# Patient Record
Sex: MALE | Race: BLACK OR AFRICAN AMERICAN | NOT HISPANIC OR LATINO | Employment: FULL TIME | URBAN - METROPOLITAN AREA
[De-identification: names, ages, dates, MRNs, and addresses within clinical notes are randomized per-mention and may not be internally consistent; named-entity substitution may affect disease eponyms.]

---

## 2017-01-11 ENCOUNTER — OFFICE VISIT (OUTPATIENT)
Dept: NEUROLOGY | Facility: CLINIC | Age: 32
End: 2017-01-11

## 2017-01-11 VITALS
HEART RATE: 83 BPM | WEIGHT: 158 LBS | SYSTOLIC BLOOD PRESSURE: 122 MMHG | HEIGHT: 74 IN | BODY MASS INDEX: 20.28 KG/M2 | OXYGEN SATURATION: 98 % | DIASTOLIC BLOOD PRESSURE: 74 MMHG

## 2017-01-11 DIAGNOSIS — G35 MULTIPLE SCLEROSIS (HCC): Primary | ICD-10-CM

## 2017-01-11 PROCEDURE — 99215 OFFICE O/P EST HI 40 MIN: CPT | Performed by: PSYCHIATRY & NEUROLOGY

## 2017-01-11 RX ORDER — LEVETIRACETAM 500 MG/1
TABLET, EXTENDED RELEASE ORAL
Qty: 60 TABLET | Refills: 6 | Status: SHIPPED | OUTPATIENT
Start: 2017-01-11 | End: 2017-02-28 | Stop reason: SDUPTHER

## 2017-01-11 NOTE — ASSESSMENT & PLAN NOTE
Sx at onset are severe.  Spastic gait with significant pain limiting ambulation to less than 25 feet with walker.     Agrees to proceed with Tysabri.  Ordered JCV.    Refer to PT    Start Keppra for LE spasticity

## 2017-01-11 NOTE — PROGRESS NOTES
Subjective:     Patient ID: Shane Hernandez is a 31 y.o. male.    History of Present Illness     30 yo male returns in follow up from hospitalization.  Oct 2016 developed numbness in left foot for 2 weeks then resolved.  12/19/16 LE started feeling weak and N/T started in anterior thighs.  Worsened to point where standing for over 5 minutes causes severe pain and cramping in LE from waist down.  Significant constipation but no bladder difficulty.  Reports perineal numbness.  Steroids and GBP of minimal benefit.   Works as  and required to stand all day.      Reviewed records from [facility] admission 12/26/16 - 12/30/16:    Presented to ED on 12/26/16 for numbness in bilateral thighs with periodic spasms of pain for 30 - 45 seconds.  Sx started 1 wk prior to admission.  Dr Art consulted and prescribed GBP, ordered LP.  Treated with IV Solu medrol     MRI; my review:    L/S - unremakable  C-spine - unremarkable  T-spine - T4; T7/8 lesions no enhancement  Brain - right MCP, pontine, CC, numerous PVWM lesions    Labs:  CSF:  IgG synthesis rate 23.7; 9 OCB, Prot 90   NMO - neg    The following portions of the patient's history were reviewed and updated as appropriate: allergies, current medications, past family history, past medical history, past social history, past surgical history and problem list.    Review of Systems   Constitutional: Positive for fatigue. Negative for activity change and unexpected weight change.   HENT: Negative for facial swelling, hearing loss, tinnitus, trouble swallowing and voice change.    Eyes: Negative for photophobia, pain and visual disturbance.   Respiratory: Negative for apnea, cough and choking.    Cardiovascular: Negative for chest pain.   Gastrointestinal: Negative for constipation, nausea and vomiting.   Endocrine: Positive for heat intolerance. Negative for cold intolerance.   Genitourinary: Negative for difficulty urinating, frequency and urgency.   Musculoskeletal:  Positive for back pain, gait problem and myalgias. Negative for arthralgias, neck pain and neck stiffness.   Skin: Negative for rash.   Allergic/Immunologic: Negative for immunocompromised state.   Neurological: Positive for tremors, weakness and numbness. Negative for dizziness, seizures, syncope, facial asymmetry, speech difficulty, light-headedness and headaches.   Hematological: Negative for adenopathy.   Psychiatric/Behavioral: Negative for confusion, decreased concentration, hallucinations and sleep disturbance. The patient is not nervous/anxious.         Objective:    Neurologic Exam     Mental Status   Oriented to person, place, and time.   Registration: recalls 3 of 3 objects. Recall at 5 minutes: recalls 3 of 3 objects. Follows 3 step commands.   Attention: normal. Concentration: normal.   Speech: speech is normal   Level of consciousness: alert  Knowledge: good and consistent with education.   Able to name object. Able to read. Able to repeat. Able to write. Normal comprehension.     Cranial Nerves     CN II   Visual fields full to confrontation.   Visual acuity: normal  Right visual field deficit: none  Left visual field deficit: none     CN III, IV, VI   Pupils are equal, round, and reactive to light.  Extraocular motions are normal.   Right pupil: Shape: regular. Reactivity: brisk. Consensual response: intact.   Left pupil: Shape: regular. Reactivity: brisk. Consensual response: intact.   Nystagmus: none   Diplopia: none  Ophthalmoparesis: none  Upgaze: normal  Downgaze: normal  Conjugate gaze: present  Vestibulo-ocular reflex: present    CN V   Facial sensation intact.   Right corneal reflex: normal  Left corneal reflex: normal    CN VII   Right facial weakness: none  Left facial weakness: none    CN VIII   Hearing: intact    CN IX, X   Palate: symmetric  Right gag reflex: normal  Left gag reflex: normal    CN XI   Right sternocleidomastoid strength: normal  Left sternocleidomastoid strength:  normal    CN XII   Tongue: not atrophic  Fasciculations: absent  Tongue deviation: none    Motor Exam   Muscle bulk: normal  Overall muscle tone: normal  Right arm tone: normal  Left arm tone: normal  Right leg tone: spastic  Left leg tone: spastic    Strength   Strength 5/5 throughout.     Sensory Exam   Light touch normal.   Right arm light touch: normal  Left arm light touch: normal  Right arm vibration: normal  Left arm vibration: normal  Right arm proprioception: normal  Left arm proprioception: normal  Right arm pinprick: normal  Left arm pinprick: normal  Lowest sensation to pinprick on right: T10  Lowest sensation to pinprick on left: T10  Lowest sensation to vibration on right: T10  Lowest sensation to vibration on left: T10    Gait, Coordination, and Reflexes     Gait  Gait: shuffling and spastic    Coordination   Romberg: negative  Finger to nose coordination: normal  Heel to shin coordination: abnormal  Tandem walking coordination: abnormal    Tremor   Resting tremor: absent  Intention tremor: absent  Action tremor: absent    Reflexes   Reflexes 2+ except as noted.   Right patellar: 3+  Left patellar: 3+  Right achilles: 3+  Left achilles: 3+  Right ankle clonus: present  Left ankle clonus: present      Physical Exam   Constitutional: He is oriented to person, place, and time. Vital signs are normal. He appears well-developed and well-nourished.   HENT:   Head: Normocephalic and atraumatic.   Eyes: EOM and lids are normal. Pupils are equal, round, and reactive to light.   Fundoscopic exam:       The right eye shows no exudate, no hemorrhage and no papilledema. The right eye shows venous pulsations.        The left eye shows no exudate, no hemorrhage and no papilledema. The left eye shows venous pulsations.   Neck: Normal range of motion and phonation normal. Normal carotid pulses present. Carotid bruit is not present. No thyroid mass and no thyromegaly present.   Cardiovascular: Normal rate, regular  rhythm and normal heart sounds.    Pulmonary/Chest: Effort normal.   Neurological: He is oriented to person, place, and time. He has normal strength. He has an abnormal Heel to Shin Test and an abnormal Tandem Gait Test. He has a normal Finger-Nose-Finger Test and a normal Romberg Test.   Reflex Scores:       Patellar reflexes are 3+ on the right side and 3+ on the left side.       Achilles reflexes are 3+ on the right side and 3+ on the left side.  Skin: Skin is warm and dry.   Psychiatric: He has a normal mood and affect. His speech is normal.   Nursing note and vitals reviewed.      Assessment/Plan:       Problems Addressed this Visit        Nervous and Auditory    Multiple sclerosis - Primary     Sx at onset are severe.  Spastic gait with significant pain limiting ambulation to less than 25 feet with walker.     Agrees to proceed with Tysabri.  Ordered JCV.    Refer to PT    Start Keppra for LE spasticity         Relevant Orders    Ambulatory Referral to Physical Therapy Evaluate and treat

## 2017-01-11 NOTE — MR AVS SNAPSHOT
Shane Hernandez   1/11/2017 9:15 AM   Office Visit    Dept Phone:  [phone]   Encounter #:  [id number]    Provider:  Segun Santos MD   Department:  [facility] NEUROLOGY                Your Full Care Plan              Today's Medication Changes          These changes are accurate as of: 1/11/17 10:15 AM.  If you have any questions, ask your nurse or doctor.               New Medication(s)Ordered:     levETIRAcetam  MG 24 hr tablet   Commonly known as:  KEPPRA XR   Take one tablet at bedtime for one week, then increase to two tablets at bedtime   Started by:  Segun Santos MD         Stop taking medication(s)listed here:     polyethylene glycol powder   Commonly known as:  MIRALAX   Stopped by:  Segun Santos MD                Where to Get Your Medications      These medications were sent to [facility] [address] [place] [address] [address] [facility] [phone] [facility] [phone] FX  [address] [facility] [place] [facility] [address]     Phone:  [phone]     levETIRAcetam  MG 24 hr tablet                  Your Updated Medication List          This list is accurate as of: 1/11/17 10:15 AM.  Always use your most recent med list.                gabapentin 400 MG capsule   Commonly known as:  NEURONTIN   Take 1 capsule by mouth Every 8 (Eight) Hours.       levETIRAcetam  MG 24 hr tablet   Commonly known as:  KEPPRA XR   Take one tablet at bedtime for one week, then increase to two tablets at bedtime               We Performed the Following     Ambulatory Referral to Physical Therapy Evaluate and treat       You Were Diagnosed With        Codes Comments    Multiple sclerosis    -  Primary ICD-10-CM: G35  ICD-9-CM: 340       Instructions     None    Patient Instructions History      Upcoming Appointments     Visit Type Date Time Department    HOSPITAL FOLLOW UP 1/11/2017  9:15 AM SUKHJINDER Mariano Signup     [facility] allows you to send  "messages to your doctor, view your test results, renew your prescriptions, schedule appointments, and more. To sign up, go to [email or web address] and click on the Sign Up Now link in the New User? box. Enter your [employer] Activation Code exactly as it appears below along with the last four digits of your Social Security Number and your Date of Birth () to complete the sign-up process. If you do not sign up before the expiration date, you must request a new code.    [employer] Activation Code: [email or web address]  Expires: 2017 10:15 AM    If you have questions, you can email [email or web address] or call [phone] to talk to our [employer] staff. Remember, [employer] is NOT to be used for urgent needs. For medical emergencies, dial 911.               Other Info from Your Visit           Allergies     No Known Allergies      Reason for Visit     N[facility]ness [facility] F/U    Extremity Weakness           Vital Signs     Blood Pressure Pulse Height Weight Oxygen Saturation Body Mass Index    122/74 83 74\" (188 cm) 158 lb (71.7 kg) 98% 20.29 kg/m2    Smoking Status                   Current Every Day Smoker           No Longer an Issue     Demyelinating disease of central nervous system    Multiple sclerosis    Weakness of both legs        "

## 2017-01-19 ENCOUNTER — HOSPITAL ENCOUNTER (OUTPATIENT)
Dept: PHYSICAL THERAPY | Facility: HOSPITAL | Age: 32
Setting detail: THERAPIES SERIES
Discharge: HOME | End: 2017-01-19
Attending: PSYCHIATRY & NEUROLOGY

## 2017-01-19 DIAGNOSIS — R52 PAIN: ICD-10-CM

## 2017-01-19 DIAGNOSIS — R26.89 BALANCE PROBLEM: ICD-10-CM

## 2017-01-19 DIAGNOSIS — R26.9 GAIT DIFFICULTY: Primary | ICD-10-CM

## 2017-01-19 PROCEDURE — 97163 PT EVAL HIGH COMPLEX 45 MIN: CPT | Performed by: PHYSICAL THERAPIST

## 2017-01-19 PROCEDURE — 97110 THERAPEUTIC EXERCISES: CPT | Performed by: PHYSICAL THERAPIST
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
## 2017-01-23 ENCOUNTER — HOSPITAL ENCOUNTER (OUTPATIENT)
Dept: PHYSICAL THERAPY | Facility: HOSPITAL | Age: 32
Setting detail: THERAPIES SERIES
Discharge: HOME | End: 2017-01-23
Attending: PSYCHIATRY & NEUROLOGY

## 2017-01-23 DIAGNOSIS — R26.89 BALANCE PROBLEM: ICD-10-CM

## 2017-01-23 DIAGNOSIS — R26.9 GAIT DIFFICULTY: Primary | ICD-10-CM

## 2017-01-23 PROCEDURE — 97110 THERAPEUTIC EXERCISES: CPT | Performed by: PHYSICAL THERAPIST

## 2017-01-23 PROCEDURE — 97112 NEUROMUSCULAR REEDUCATION: CPT | Performed by: PHYSICAL THERAPIST

## 2017-01-23 NOTE — PROGRESS NOTES
"    Outpatient Physical Therapy Neuro Treatment Note  [facility]     Patient Name: Shane Hernandez  : 1985  MRN: [id number]  Today's Date: 2017      Visit Date: 2017    Visit Dx:    ICD-10-CM ICD-9-CM   1. Gait difficulty R26.9 781.2   2. Balance problem R26.89 781.99       Patient Active Problem List   Diagnosis   • Multiple sclerosis                 PT Neuro       17 0800          Subjective Comments    Subjective Comments \"I'm good today.\"  -MW      Subjective Pain    Able to rate subjective pain? yes  -MW      Pre-Treatment Pain Level 2  -MW      Post-Treatment Pain Level 7  -MW      Balance Skills Training    Training Strategies (Balance Skills) Issued and performed HEP, see for details with quadraped, B sidestepping with blue tband, heel/toe raises, and bridging with VC's to perform properly.  Standing balance on blue foam with EC with SBA and then alternate tapping 10\" step x 10, one foot on step and hold with B cervical rot/flex/ext x 10 with min A and then B fwd step up onto/off 10\" step from foam x 10 with min A for balance.  Alternating lunge to BOSU without UE A x 10 with min A and LOB 20% of the time.  Standing on both sides of BOSU with B cerivcal rot/flex/ext x 10 with min/mod A for balance.  Half kneeling B with B cerivcal rot/flex/ext x 10 with CGA.  -MW        User Key  (r) = Recorded By, (t) = Taken By, (c) = Cosigned By    Initials Name Provider Type    RICK Wu, PT Physical Therapist                        PT Assessment/Plan       17 0800 17 1000       PT Assessment    Functional Limitations  Decreased safety during functional activities;Impaired gait;Impaired locomotion;Limitation in home management;Limitations in community activities;Performance in leisure activities;Performance in self-care ADL;Performance in work activities  -MW     Impairments  Balance;Coordination;Endurance;Gait;Impaired muscle endurance;Impaired sensory " "integrity;Locomotion;Motor function;Muscle strength;Pain;Sensation  -MW     Assessment Comments Pt. requires min/mod A with standing dynamic balance acitivites.  Pt. demonstrates increased B LE spasming with B LE activities.  Pt. and pts girlfriend educated on how to modify activity and rest and then exercise and not to push so much that he can't function.    -MW Pt. demonstrates decreased functional mobility secondary to MS.  Pt. to benefit from skilled PT services 2x/wk to improve towards goals.  -MW     Please refer to paper survey for additional self-reported information  Yes  -MW     Rehab Potential  Good  -MW     Patient/caregiver participated in establishment of treatment plan and goals  Yes  -MW     Patient would benefit from skilled therapy intervention  Yes  -MW     PT Plan    PT Frequency  2x/week  -MW     Predicted Duration of Therapy Intervention (days/wks)  24 visits  -MW     Planned CPT's?  PT EVAL: 90296;PT THER PROC EA 15 MIN: 83279;PT NEUROMUSC RE-EDUCATION EA 15 MIN: 17388;PT GAIT TRAINING EA 15 MIN: 57907  -MW     PT Plan Comments Continue with PT services to improve gait, balance, strength, transfers and functional mobility.  -MW PT services to improve gait, balance, strength, transfers and functional mobility.  -MW       User Key  (r) = Recorded By, (t) = Taken By, (c) = Cosigned By    Initials Name Provider Type    MW Amber Wu, PT Physical Therapist                     Exercises       01/23/17 0800          Subjective Comments    Subjective Comments \"I'm good today.\"  -MW      Subjective Pain    Able to rate subjective pain? yes  -MW      Pre-Treatment Pain Level 2  -MW      Post-Treatment Pain Level 7  -MW      Exercise 1    Exercise Name 1 NuStep L6  -MW      Time (Minutes) 1 12   B UE/LE's  -MW      Exercise 2    Exercise Name 2 DLP  -MW      Equipment 2 Leg Press  -MW      Sets 2 1  -MW      Time (Minutes) 2 2  -MW        User Key  (r) = Recorded By, (t) = Taken By, (c) = Cosigned " By    Initials Name Provider Type    RICK Wu, PT Physical Therapist                              PT OP Goals       01/19/17 1000          PT Short Term Goals    STG Date to Achieve 03/02/17  -MW      STG 1 Patient to improve PARSONS balance score to >/= 44/56 to decrease client's risk of falls.  -MW      STG 1 Progress New  -MW      STG 2 Patient to perform TUG within 14 sec without LOB for improved functional mobility.  -MW      STG 2 Progress New  -MW      STG 3 Patient to improve FGA score to >/= 13/30 to decrease client's risk of falls.  -MW      STG 3 Progress New  -MW      STG 4 Patient to score no greater then  40/60 on the MSWS-12 to demonstrate improved walking ability per patient's observations with daily activities.  -MW      STG 4 Progress New  -MW      STG 5 Pt. to score no greater then 80  on the MSIS-29 to demonstrate improved functional mobility.  -MW      STG 5 Progress New  -MW      Long Term Goals    LTG Date to Achieve 04/13/17  -MW      LTG 1 Patient to improve PARSONS balance score to >/= 51/56 to decrease client's risk of falls.  -MW      LTG 1 Progress New  -MW      LTG 2 Patient to perform TUG within 12 sec without LOB for improved functional mobility.  -MW      LTG 2 Progress New  -MW      LTG 3 Patient to improve FGA score to >/= 23/30 to decrease client's risk of falls.  -MW      LTG 3 Progress New  -MW      LTG 4 Pt. to improve DGI score to >/=  21/24 to decrease pts risk of falls  -MW      LTG 4 Progress New  -MW      LTG 5 Patient to ambulate 25 feet without AD within 8 sec without LOB at a regular pace for improved gait mela.  -MW      LTG 5 Progress New  -MW      LTG 6 Patient to ambulate 10 meters without AD within 10 sec without LOB for improved gait mela and functional mobility  -      LTG 6 Progress New  -MW      LTG 7 Patient to score no greater then  30/60 on the MSWS-12 to demonstrate improved walking ability per patient's observations with daily activities.  -MW       LTG 7 Progress New  -MW      LTG 8 Pt. to score no greater then 65 on the MSIS-29 to demonstrate improved functional mobility.  -MW      LTG 8 Progress New  -MW      LTG 9 Pt. to be I with HEP.  -MW      LTG 9 Progress New  -MW      Time Calculation    PT Goal Re-Cert Due Date 02/16/17  -MW        User Key  (r) = Recorded By, (t) = Taken By, (c) = Cosigned By    Initials Name Provider Type    RICK Wu PT Physical Therapist                Therapy Education       01/23/17 0904    Therapy Education    Given HEP;Symptoms/condition management  -MW    Program New  -MW    How Provided Verbal;Demonstration;Written  -MW    Provided to Caregiver;Patient  -MW    Level of Understanding Verbalized;Demonstrated  -MW      01/19/17 1337    Therapy Education    Given HEP;Symptoms/condition management  -MW    Program New  -MW    How Provided Verbal;Demonstration;Written  -MW    Provided to Patient;Caregiver  -MW    Level of Understanding Verbalized  -MW      User Key  (r) = Recorded By, (t) = Taken By, (c) = Cosigned By    Initials Name Provider Type    RICK Wu PT Physical Therapist                Time Calculation:   Start Time: 0800  Total Timed Code Minutes- PT: 55 minute(s)     Therapy Charges for Today     Code Description Service Date Service Provider Modifiers Qty    16423928194 HC PT NEUROMUSC RE EDUCATION EA 15 MIN 1/23/2017 Amber Wu, PT GP 3    18991536105 HC PT THER PROC EA 15 MIN 1/23/2017 Amber Wu PT GP 1                    Amber Wu, PT  1/23/2017

## 2017-01-25 ENCOUNTER — APPOINTMENT (OUTPATIENT)
Dept: PHYSICAL THERAPY | Facility: HOSPITAL | Age: 32
End: 2017-01-25
Attending: PSYCHIATRY & NEUROLOGY

## 2017-01-26 ENCOUNTER — TELEPHONE (OUTPATIENT)
Dept: NEUROLOGY | Facility: CLINIC | Age: 32
End: 2017-01-26

## 2017-01-26 NOTE — TELEPHONE ENCOUNTER
Peer to Peer completed by Dr. Santos with [employer] for authorization of Tysabri.  Approved from 1/27/17-1/26/18, #[id number].

## 2017-01-27 ENCOUNTER — HOSPITAL ENCOUNTER (OUTPATIENT)
Dept: PHYSICAL THERAPY | Facility: HOSPITAL | Age: 32
Setting detail: THERAPIES SERIES
Discharge: HOME | End: 2017-01-27
Attending: PSYCHIATRY & NEUROLOGY

## 2017-01-27 ENCOUNTER — TELEPHONE (OUTPATIENT)
Dept: NEUROLOGY | Facility: CLINIC | Age: 32
End: 2017-01-27

## 2017-01-27 DIAGNOSIS — R26.89 BALANCE PROBLEM: ICD-10-CM

## 2017-01-27 DIAGNOSIS — R26.9 GAIT DIFFICULTY: Primary | ICD-10-CM

## 2017-01-27 PROCEDURE — 97110 THERAPEUTIC EXERCISES: CPT | Performed by: PHYSICAL THERAPIST

## 2017-01-27 PROCEDURE — 97112 NEUROMUSCULAR REEDUCATION: CPT | Performed by: PHYSICAL THERAPIST

## 2017-01-27 RX ORDER — GABAPENTIN 400 MG/1
400 CAPSULE ORAL 3 TIMES DAILY
Qty: 90 CAPSULE | Refills: 5 | Status: SHIPPED | OUTPATIENT
Start: 2017-01-27 | End: 2017-05-05 | Stop reason: SDUPTHER

## 2017-01-27 NOTE — TELEPHONE ENCOUNTER
----- Message from Ashley Mcnair sent at 1/27/2017  1:56 PM EST -----  Regarding: REFILL  Contact: [phone]  Patient request refill Gabapentin 400mg    Nik GUSTAFSON    You didn't give to her but she stated that you said you will refill it

## 2017-02-03 ENCOUNTER — HOSPITAL ENCOUNTER (OUTPATIENT)
Dept: PHYSICAL THERAPY | Facility: HOSPITAL | Age: 32
Setting detail: THERAPIES SERIES
Discharge: HOME | End: 2017-02-03
Attending: PSYCHIATRY & NEUROLOGY

## 2017-02-03 DIAGNOSIS — R26.9 GAIT DIFFICULTY: Primary | ICD-10-CM

## 2017-02-03 DIAGNOSIS — R26.89 BALANCE PROBLEM: ICD-10-CM

## 2017-02-03 PROCEDURE — 97112 NEUROMUSCULAR REEDUCATION: CPT | Performed by: PHYSICAL THERAPIST

## 2017-02-03 PROCEDURE — 97110 THERAPEUTIC EXERCISES: CPT | Performed by: PHYSICAL THERAPIST

## 2017-02-03 NOTE — PROGRESS NOTES
"    Outpatient Physical Therapy Neuro Treatment Note  [facility]     Patient Name: Shane Hernandez  : 1985  MRN: [id number]  Today's Date: 2/3/2017      Visit Date: 2017    Visit Dx:    ICD-10-CM ICD-9-CM   1. Gait difficulty R26.9 781.2   2. Balance problem R26.89 781.99       Patient Active Problem List   Diagnosis   • Multiple sclerosis                 PT Neuro       17 08          Subjective Comments    Subjective Comments \"The infusion went better then I thought it would.\"  -MW      Subjective Pain    Able to rate subjective pain? yes  -MW      Pre-Treatment Pain Level 0  -MW      Balance Skills Training    Training Strategies (Balance Skills) Standing balance tandem on rounded surface of half foam roll with LE behind tapping cone with min A for balance, standing tandem on flat surface of half foam roll with B shoulder flexion overhead x 10 with CGA, standing sideways on half foam on both sides with sidestepping with min A and LOB 50% of the time.  Plyometric jumping on trampoline with/without UE A x 10, B hip ab/dduction jumping x 10, ski jump x 10 with CGA/min A.    -        User Key  (r) = Recorded By, (t) = Taken By, (c) = Cosigned By    Initials Name Provider Type    RICK Wu, PT Physical Therapist                        PT Assessment/Plan       17 08          PT Assessment    Assessment Comments Pt. requires min A to maintain balance with dynamic activities. Pt. demonstrates B muscle spasms with standing activities.  Pt. to benefit from continued therapy services.  -      PT Plan    PT Plan Comments Continue with PT services to improve gait, balance, strength and functional mobility.  -        User Key  (r) = Recorded By, (t) = Taken By, (c) = Cosigned By    Initials Name Provider Type    RICK Wu, PT Physical Therapist                     Exercises       17 08          Subjective Comments    Subjective Comments \"The infusion went better then " "I thought it would.\"  -MW      Subjective Pain    Able to rate subjective pain? yes  -MW      Pre-Treatment Pain Level 0  -MW      Exercise 1    Exercise Name 1 SciFit L4  -MW      Time (Minutes) 1 8   4 min fwd/4 min bwd  -MW      Exercise 2    Exercise Name 2 DLP  -MW      Equipment 2 Leg Press  -MW      Sets 2 1  -MW      Time (Minutes) 2 2  -MW      Exercise 3    Exercise Name 3 B fire hydrant  -MW      Sets 3 2  -MW      Reps 3 10  -MW      Exercise 4    Exercise Name 4 prone on elbows plank  -MW      Sets 4 1  -MW      Reps 4 10  -MW      Exercise 5    Exercise Name 5 B UE's on BOSU with push-up on knees and tricep press ups  -MW      Sets 5 2  -MW      Reps 5 5  -MW      Exercise 6    Exercise Name 6 supine B hamstring stretch  -MW      Sets 6 2  -MW      Time (Minutes) 6 1  -MW        User Key  (r) = Recorded By, (t) = Taken By, (c) = Cosigned By    Initials Name Provider Type    RICK Wu PT Physical Therapist                                  Therapy Education       02/03/17 1005    Therapy Education    Given Symptoms/condition management;Posture/body mechanics;Mobility training  -MW    Program Reinforced  -MW    How Provided Verbal  -MW    Provided to Patient  -MW    Level of Understanding Verbalized  -MW      User Key  (r) = Recorded By, (t) = Taken By, (c) = Cosigned By    Initials Name Provider Type    RICK Wu PT Physical Therapist                Time Calculation:   Start Time: 0800  Total Timed Code Minutes- PT: 55 minute(s)     Therapy Charges for Today     Code Description Service Date Service Provider Modifiers Qty    84747666897  PT NEUROMUSC RE EDUCATION EA 15 MIN 2/3/2017 Amber Wu, PT GP 2    [id number]  PT THER PROC EA 15 MIN 2/3/2017 Amber Wu PT GP 2                    Amber Wu, PT  2/3/2017     "

## 2017-02-13 ENCOUNTER — HOSPITAL ENCOUNTER (OUTPATIENT)
Dept: PHYSICAL THERAPY | Facility: HOSPITAL | Age: 32
Setting detail: THERAPIES SERIES
Discharge: HOME | End: 2017-02-13
Attending: PSYCHIATRY & NEUROLOGY

## 2017-02-13 DIAGNOSIS — R26.89 BALANCE PROBLEM: ICD-10-CM

## 2017-02-13 DIAGNOSIS — R26.9 GAIT DIFFICULTY: Primary | ICD-10-CM

## 2017-02-13 PROCEDURE — 97110 THERAPEUTIC EXERCISES: CPT | Performed by: PHYSICAL THERAPIST

## 2017-02-13 PROCEDURE — 97112 NEUROMUSCULAR REEDUCATION: CPT | Performed by: PHYSICAL THERAPIST

## 2017-02-13 NOTE — PROGRESS NOTES
"    Outpatient Physical Therapy Neuro Treatment Note  [facility]     Patient Name: Shane Hernandez  : 1985  MRN: [id number]  Today's Date: 2017      Visit Date: 2017    Visit Dx:    ICD-10-CM ICD-9-CM   1. Gait difficulty R26.9 781.2   2. Balance problem R26.89 781.99       Patient Active Problem List   Diagnosis   • Multiple sclerosis                 PT Neuro       17 0800          Subjective Comments    Subjective Comments \"I'm sorry I wasn't here last week.  I couldn't move my legs.  They were really stiff and I couldn't move. I stayed in bed most of the day.\"  -MW      Subjective Pain    Able to rate subjective pain? yes  -MW      Pre-Treatment Pain Level 4  -MW      Post-Treatment Pain Level 4  -MW      Balance Skills Training    Training Strategies (Balance Skills) Standing balance with alternating tapping 10\" step x 10, alternating fwd step up onto/off 10\" step and straddle step up B x 10 without UE A with CGA.  Standing on blue foam with alternating stepping up onto/off 10\" step x 10 with min A for balance.  Standing on trampoline with narrow SARAH, B semi-tandem and hold with EC up to 15 sec and then added B cervical rot/flex/ext x 10 with EC with min A.  Plyometric jumping on trampoline without UE A x 10, B hip ab/adduction jumping x 10, ski jumping x 10 with CGA without LOB.  Standing on rocker board R/L and ant/post with UE reaching across midline x 10, 2 sets.    -        User Key  (r) = Recorded By, (t) = Taken By, (c) = Cosigned By    Initials Name Provider Type     Amber Wu, PT Physical Therapist                        PT Assessment/Plan       17 0800          PT Assessment    Assessment Comments Pt. requires min A with standing dynamic balance activities.  Pt. reports incrase in B LE pain and discomfort with some standing dynmaic balance activities requiring pt to sit and rest.  Pt. to benefit from continued therapy services.  -      PT Plan    PT Plan " "Comments Continue with PT services to improve gait, balance, strength and functional mobility.    -MW        User Key  (r) = Recorded By, (t) = Taken By, (c) = Cosigned By    Initials Name Provider Type    RICK Wu PT Physical Therapist                     Exercises       02/13/17 0800          Subjective Comments    Subjective Comments \"I'm sorry I wasn't here last week.  I couldn't move my legs.  They were really stiff and I couldn't move. I stayed in bed most of the day.\"  -MW      Subjective Pain    Able to rate subjective pain? yes  -MW      Pre-Treatment Pain Level 4  -MW      Post-Treatment Pain Level 4  -MW      Exercise 1    Exercise Name 1 NuSTep L6  -MW      Time (Minutes) 1 12   B UE/LE\"s  -MW      Exercise 2    Exercise Name 2 Supine abdominal crunches keeping neck long  -MW      Sets 2 2  -MW      Reps 2 10  -MW      Exercise 3    Exercise Name 3 Lower abdominal crunch with LE's lifted  -MW      Sets 3 2  -MW      Reps 3 10  -MW        User Key  (r) = Recorded By, (t) = Taken By, (c) = Cosigned By    Initials Name Provider Type    RICK Wu PT Physical Therapist                                  Therapy Education       02/13/17 0908    Therapy Education    Given Symptoms/condition management;Posture/body mechanics;Mobility training   issued pt flyer about [employer] support group  -MW    Program Reinforced  -MW    How Provided Verbal  -MW    Provided to Patient  -MW    Level of Understanding Verbalized  -MW      User Key  (r) = Recorded By, (t) = Taken By, (c) = Cosigned By    Initials Name Provider Type    RICK Wu PT Physical Therapist                Time Calculation:   Start Time: 0800  Total Timed Code Minutes- PT: 55 minute(s)     Therapy Charges for Today     Code Description Service Date Service Provider Modifiers Qty    04174984153 HC PT NEUROMUSC RE EDUCATION EA 15 MIN 2/13/2017 Amber Wu, PT GP 2    09778522872 HC PT THER PROC EA 15 MIN 2/13/2017 " Amber Wu, PT GP 2                    Amber Wu, PT  2/13/2017

## 2017-02-16 ENCOUNTER — APPOINTMENT (OUTPATIENT)
Dept: PHYSICAL THERAPY | Facility: HOSPITAL | Age: 32
End: 2017-02-16

## 2017-02-20 ENCOUNTER — HOSPITAL ENCOUNTER (OUTPATIENT)
Dept: PHYSICAL THERAPY | Facility: HOSPITAL | Age: 32
Setting detail: THERAPIES SERIES
Discharge: HOME | End: 2017-02-20

## 2017-02-20 DIAGNOSIS — R26.89 BALANCE PROBLEM: ICD-10-CM

## 2017-02-20 DIAGNOSIS — R26.9 GAIT DIFFICULTY: Primary | ICD-10-CM

## 2017-02-20 PROCEDURE — 97112 NEUROMUSCULAR REEDUCATION: CPT | Performed by: PHYSICAL THERAPIST

## 2017-02-20 PROCEDURE — 97110 THERAPEUTIC EXERCISES: CPT | Performed by: PHYSICAL THERAPIST

## 2017-02-20 NOTE — THERAPY DISCHARGE NOTE
"      Outpatient Physical Therapy Neuro Treatment Note/Discharge Summary  [facility]     Patient Name: Shane Hernandez  : 1985  MRN: [id number]  Today's Date: 2017      Visit Date: 2017    Visit Dx:    ICD-10-CM ICD-9-CM   1. Gait difficulty R26.9 781.2   2. Balance problem R26.89 781.99       Patient Active Problem List   Diagnosis   • Multiple sclerosis                  PT Neuro       17 0800          Subjective Comments    Subjective Comments \"I'm doing good.\"  -MW      Subjective Pain    Able to rate subjective pain? yes  -MW      Pre-Treatment Pain Level 0  -MW      Post-Treatment Pain Level 0  -MW      Balance Skills Training    Training Strategies (Balance Skills) Re-assessment completed, see for details.  Pt. issued additional HEP for core, B LE strengthening with pt issued gray and green tband with min VC's.  -MW        User Key  (r) = Recorded By, (t) = Taken By, (c) = Cosigned By    Initials Name Provider Type    RICK Wu, PT Physical Therapist                        PT Assessment/Plan       17 0900          PT Assessment    Assessment Comments Pt. met all goals today and was issued HEP.  Pt. and his girlfriend educated that when he returns to work it cannot be full time and pt will require a seat to accomidate and pt agreed.  Pt. to be discharged from PT services secondary to meeting all goals.  -MW      Please refer to paper survey for additional self-reported information Yes  -MW      PT Plan    PT Plan Comments Discharge from PT services.  -MW        User Key  (r) = Recorded By, (t) = Taken By, (c) = Cosigned By    Initials Name Provider Type    RICK Wu, PT Physical Therapist                     Exercises       17 0800          Subjective Comments    Subjective Comments \"I'm doing good.\"  -MW      Subjective Pain    Able to rate subjective pain? yes  -MW      Pre-Treatment Pain Level 0  -MW      Post-Treatment Pain Level 0  -MW      Exercise 1 " "   Exercise Name 1 NuSTep L6  -MW      Time (Minutes) 1 14   B UE/LE\"s  -MW      Exercise 2    Exercise Name 2 Issued and performed HEP, see for details.  -MW        User Key  (r) = Recorded By, (t) = Taken By, (c) = Cosigned By    Initials Name Provider Type    RICK Wu, PT Physical Therapist                              PT OP Goals       02/20/17 0800          PT Short Term Goals    STG Date to Achieve 03/02/17  -MW      STG 1 Patient to improve PARSONS balance score to >/= 44/56 to decrease client's risk of falls.  -MW      STG 1 Progress Met  -MW      STG 2 Patient to perform TUG within 14 sec without LOB for improved functional mobility.  -MW      STG 2 Progress Met  -MW      STG 3 Patient to improve FGA score to >/= 13/30 to decrease client's risk of falls.  -MW      STG 3 Progress Met  -MW      STG 4 Patient to score no greater then  40/60 on the MSWS-12 to demonstrate improved walking ability per patient's observations with daily activities.  -MW      STG 4 Progress Met  -MW      STG 5 Pt. to score no greater then 80  on the MSIS-29 to demonstrate improved functional mobility.  -MW      STG 5 Progress Met  -MW      Long Term Goals    LTG Date to Achieve 04/13/17  -MW      LTG 1 Patient to improve PARSONS balance score to >/= 51/56 to decrease client's risk of falls.  -MW      LTG 1 Progress Met  -MW      LTG 2 Patient to perform TUG within 12 sec without LOB for improved functional mobility.  -MW      LTG 2 Progress Met  -MW      LTG 3 Patient to improve FGA score to >/= 23/30 to decrease client's risk of falls.  -MW      LTG 3 Progress Met  -MW      LTG 4 Pt. to improve DGI score to >/=  21/24 to decrease pts risk of falls  -MW      LTG 4 Progress Met  -MW      LTG 5 Patient to ambulate 25 feet without AD within 8 sec without LOB at a regular pace for improved gait emla.  -MW      LTG 5 Progress Met  -MW      LTG 6 Patient to ambulate 10 meters without AD within 10 sec without LOB for improved gait " mela and functional mobility  -MW      LTG 6 Progress Met  -MW      LTG 7 Patient to score no greater then  30/60 on the MSWS-12 to demonstrate improved walking ability per patient's observations with daily activities.  -MW      LTG 7 Progress Met  -MW      LTG 8 Pt. to score no greater then 65 on the MSIS-29 to demonstrate improved functional mobility.  -MW      LTG 8 Progress Met  -MW      LTG 9 Pt. to be I with HEP.  -MW      LTG 9 Progress Met  -MW        User Key  (r) = Recorded By, (t) = Taken By, (c) = Cosigned By    Initials Name Provider Type    RICK Wu PT Physical Therapist                Therapy Education       02/20/17 0920    Therapy Education    Given HEP;Symptoms/condition management;Other (comment)   returning to work  -MW    Program New  -MW    How Provided Verbal;Demonstration;Written  -MW    Provided to Patient;Caregiver  -MW    Level of Understanding Verbalized;Demonstrated;Teach back education performed  -MW      User Key  (r) = Recorded By, (t) = Taken By, (c) = Cosigned By    Initials Name Provider Type    RICK Wu PT Physical Therapist                Outcome Measures       02/20/17 0800          10 Meter Walk Test Self-Selected Velocity    Self-Selected Velocity: Trial 1 6.95 sec.  -MW      10 Meter Walk Test Fast Velocity    10 Meter Walk Fast Velocity: Trial 1 5.97 sec.  -MW      25 Foot Walk    Walk #1 5.92 seconds  -MW      Walk #2 4.42 seconds  -MW      25 Foot Walk Average Time 5.17 seconds  -MW      Vernon Balance Scale    Sitting to Standing 4  -MW      Standing Unsupported 4  -MW      Sitting with Back Unsupported but Feet Supported on Floor or on Stool 4  -MW      Standing to Sitting 4  -MW      Transfers 4  -MW      Standing Unsupported with Eyes Closed 4  -MW      Standing Unsupported with Feet Together 4  -MW      Reaching Forward with Outstretched Arm While Standing 4  -MW       Object From the Floor From a Standing Position 4  -MW      Turning  to Look Behind Over Left and Right Shoulders While Standing 4  -MW      Turn 360 Degrees 4  -MW      Place Alternate Foot on Step or Stool While Standing Unsupported 4  -MW      Standing Unsupported with One Foot in Front 4  -MW      Standing on One Leg 4  -MW      Vernon Total Score 56  -MW      Dynamic Gait Index (DGI)    Gait Level Surface 3  -MW      Change in Gait Speed 3  -MW      Gait with Horizontal Head Turns 3  -MW      Gait with Vertical Head Turns 3  -MW      Gait and Pivot Turn 3  -MW      Step Over Obstacle 3  -MW      Step Around Obstacles 3  -MW      Steps 2  -MW      Dynamic Gait Index Score 23  -MW      Functional Gait Assessment (FGA)    Gait Level Surface 3  -MW      Change in Gait Speed 3  -MW      Gait with Horizontal Head Turns 3  -MW      Gait with Vertical Head Turns 3  -MW      Gait and Pivot Turn 3  -MW      Step Over Obstacle 3  -MW      Gait with Narrow Base of Support 3  -MW      Gait with Eyes Closed 3  -MW      Ambulating Backwards 3  -MW      Steps 2  -MW      FGA Total Score 29  -MW      Mini Best    Mini Best Score/Comments 27/28  -MW      MSIS-12    MSIS-12 Comments 28/60  -MW      MSIS-29    MSIS-29 Comments 51  -MW      Timed Up and Go (TUG)    TUG Test 1 6.24 seconds  -MW      Functional Assessment    Outcome Measure Options 10 Meter Walk;25 Foot Walk;Vernon Balance;Dynamic Gait Index;FGA (Functional Gait Assessment);Mini-Best Test;MSIS-12;MSIS-29;Timed Up and Go (TUG)  -MW        User Key  (r) = Recorded By, (t) = Taken By, (c) = Cosigned By    Initials Name Provider Type     Amber Wu PT Physical Therapist          Time Calculation:   Start Time: 0800  Total Timed Code Minutes- PT: 60 minute(s)     Therapy Charges for Today     Code Description Service Date Service Provider Modifiers Qty    38317469977 HC PT NEUROMUSC RE EDUCATION EA 15 MIN 2/20/2017 Amber Wu, PT GP 3    81734461921 HC PT THER PROC EA 15 MIN 2/20/2017 Amber Wu, PT GP 1          PT  G-Codes  Outcome Measure Options: 10 Meter Walk, 25 Foot Walk, Vernon Balance, Dynamic Gait Index, FGA (Functional Gait Assessment), Mini-Best Test, MSIS-12, MSIS-29, Timed Up and Go (TUG)     OP PT Discharge Summary  Date of Discharge: 02/20/17  Reason for Discharge: All goals achieved  Outcomes Achieved: Able to achieve all goals within established timeline  Discharge Destination: Home with home program  Discharge Instructions: pt to return back to work with accomidations of 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

## 2017-02-23 ENCOUNTER — APPOINTMENT (OUTPATIENT)
Dept: PHYSICAL THERAPY | Facility: HOSPITAL | Age: 32
End: 2017-02-23

## 2017-02-27 ENCOUNTER — APPOINTMENT (OUTPATIENT)
Dept: PHYSICAL THERAPY | Facility: HOSPITAL | Age: 32
End: 2017-02-27

## 2017-02-28 ENCOUNTER — OFFICE VISIT (OUTPATIENT)
Dept: NEUROLOGY | Facility: CLINIC | Age: 32
End: 2017-02-28

## 2017-02-28 VITALS
HEART RATE: 77 BPM | BODY MASS INDEX: 20.35 KG/M2 | OXYGEN SATURATION: 98 % | WEIGHT: 158.6 LBS | DIASTOLIC BLOOD PRESSURE: 82 MMHG | SYSTOLIC BLOOD PRESSURE: 124 MMHG | HEIGHT: 74 IN

## 2017-02-28 DIAGNOSIS — G35 MULTIPLE SCLEROSIS (HCC): Primary | ICD-10-CM

## 2017-02-28 DIAGNOSIS — R25.2 SPASTICITY: ICD-10-CM

## 2017-02-28 PROCEDURE — 99213 OFFICE O/P EST LOW 20 MIN: CPT | Performed by: PSYCHIATRY & NEUROLOGY

## 2017-02-28 RX ORDER — LEVETIRACETAM 500 MG/1
1500 TABLET, EXTENDED RELEASE ORAL DAILY
Qty: 60 TABLET | Refills: 6 | Status: SHIPPED | OUTPATIENT
Start: 2017-02-28 | End: 2017-08-21 | Stop reason: ALTCHOICE

## 2017-02-28 NOTE — PROGRESS NOTES
Subjective:     Patient ID: Shane Hernandez is a 32 y.o. male.    History of Present Illness     32 yo male with RRMS and LE spasticity returns in follow up.  Last visit on 1/11/17 ordered JCV ab, referred to PT, started Keppra for spasticity and started Tysabri.    JCV ab index 2.44    RRMS     Two infusions of Tysabri with 90% decline in stiffness and N/T in LE.  Fatigue is mild to moderate.  Constipation improved.  Bladder working well.  Perineal numbness resolved.      LE spasticity    Stiffness and cramping in legs decreased with Keppra.  Improved sleep quality as legs would awaken from sleep.  Up walking on legs has burning and stinging in legs when up standing for over 10 to 15 minutes.        Problem history:    2016 developed numbness in left foot for 2 weeks then resolved.  12/19/16 LE started feeling weak and N/T started in anterior thighs.  Worsened to point where standing for over 5 minutes causes severe pain and cramping in LE from waist down.  Significant constipation but no bladder difficulty.  Reports perineal numbness.  Steroids and GBP of minimal benefit.   Works as  and required to stand all day.      Reviewed records from [facility] admission 12/26/16 - 12/30/16:    Presented to ED on 12/26/16 for numbness in bilateral thighs with periodic spasms of pain for 30 - 45 seconds.  Sx started 1 wk prior to admission.  Dr Art consulted and prescribed GBP, ordered LP.  Treated with IV Solu medrol     MRI; my review:    L/S - unremakable  C-spine - unremarkable  T-spine - T4; T7/8 lesions no enhancement  Brain - right MCP, pontine, CC, numerous PVWM lesions    Labs:  CSF:  IgG synthesis rate 23.7; 9 OCB, Prot 90   NMO - neg    The following portions of the patient's history were reviewed and updated as appropriate: allergies, current medications, past family history, past medical history, past social history, past surgical history and problem list.    Review of Systems   Constitutional: Negative  for activity change and unexpected weight change.   HENT: Negative for facial swelling, hearing loss, tinnitus, trouble swallowing and voice change.    Eyes: Negative for photophobia, pain and visual disturbance.   Respiratory: Negative for apnea and choking.    Gastrointestinal: Negative for constipation.   Endocrine: Positive for heat intolerance. Negative for cold intolerance.   Genitourinary: Negative for difficulty urinating, frequency and urgency.   Musculoskeletal: Positive for back pain and gait problem. Negative for neck stiffness.   Allergic/Immunologic: Negative for immunocompromised state.   Neurological: Positive for tremors. Negative for dizziness, seizures, syncope, facial asymmetry, speech difficulty and light-headedness.   Hematological: Negative for adenopathy.   Psychiatric/Behavioral: Negative for confusion, decreased concentration, hallucinations and sleep disturbance. The patient is not nervous/anxious.         Objective:    Neurologic Exam     Mental Status   Oriented to person, place, and time.   Registration: recalls 3 of 3 objects. Recall at 5 minutes: recalls 3 of 3 objects. Follows 3 step commands.   Attention: normal. Concentration: normal.   Speech: speech is normal   Level of consciousness: alert  Knowledge: good and consistent with education.   Able to name object. Able to read. Able to repeat. Able to write. Normal comprehension.     Cranial Nerves     CN II   Visual fields full to confrontation.   Visual acuity: normal  Right visual field deficit: none  Left visual field deficit: none     CN III, IV, VI   Pupils are equal, round, and reactive to light.  Extraocular motions are normal.   Right pupil: Shape: regular. Reactivity: brisk. Consensual response: intact.   Left pupil: Shape: regular. Reactivity: brisk. Consensual response: intact.   Nystagmus: none   Diplopia: none  Ophthalmoparesis: none  Upgaze: normal  Downgaze: normal  Conjugate gaze: present  Vestibulo-ocular reflex:  present    CN V   Facial sensation intact.   Right corneal reflex: normal  Left corneal reflex: normal    CN VII   Right facial weakness: none  Left facial weakness: none    CN VIII   Hearing: intact    CN IX, X   Palate: symmetric  Right gag reflex: normal  Left gag reflex: normal    CN XI   Right sternocleidomastoid strength: normal  Left sternocleidomastoid strength: normal    CN XII   Tongue: not atrophic  Fasciculations: absent  Tongue deviation: none    Motor Exam   Muscle bulk: normal  Overall muscle tone: normal  Right arm tone: normal  Left arm tone: normal  Right leg tone: spastic  Left leg tone: spastic    Strength   Strength 5/5 throughout.     Sensory Exam   Light touch normal.   Right arm light touch: normal  Left arm light touch: normal  Right arm vibration: normal  Left arm vibration: normal  Right arm proprioception: normal  Left arm proprioception: normal  Right arm pinprick: normal  Left arm pinprick: normal  Lowest sensation to pinprick on right: T10  Lowest sensation to pinprick on left: T10  Lowest sensation to vibration on right: T10  Lowest sensation to vibration on left: T10    Gait, Coordination, and Reflexes     Gait  Gait: shuffling and spastic    Coordination   Romberg: negative  Finger to nose coordination: normal  Heel to shin coordination: abnormal  Tandem walking coordination: abnormal    Tremor   Resting tremor: absent  Intention tremor: absent  Action tremor: absent    Reflexes   Reflexes 2+ except as noted.   Right patellar: 3+  Left patellar: 3+  Right achilles: 3+  Left achilles: 3+  Right ankle clonus: present  Left ankle clonus: present      Physical Exam   Constitutional: He is oriented to person, place, and time. Vital signs are normal. He appears well-developed and well-nourished.   HENT:   Head: Normocephalic and atraumatic.   Eyes: EOM and lids are normal. Pupils are equal, round, and reactive to light.   Fundoscopic exam:       The right eye shows no exudate, no  hemorrhage and no papilledema. The right eye shows venous pulsations.        The left eye shows no exudate, no hemorrhage and no papilledema. The left eye shows venous pulsations.   Neck: Normal range of motion and phonation normal. Normal carotid pulses present. Carotid bruit is not present. No thyroid mass and no thyromegaly present.   Cardiovascular: Normal rate, regular rhythm and normal heart sounds.    Pulmonary/Chest: Effort normal.   Neurological: He is oriented to person, place, and time. He has normal strength. He has an abnormal Heel to Shin Test and an abnormal Tandem Gait Test. He has a normal Finger-Nose-Finger Test and a normal Romberg Test.   Reflex Scores:       Patellar reflexes are 3+ on the right side and 3+ on the left side.       Achilles reflexes are 3+ on the right side and 3+ on the left side.  Skin: Skin is warm and dry.   Psychiatric: He has a normal mood and affect. His speech is normal.   Nursing note and vitals reviewed.      Assessment/Plan:       Problems Addressed this Visit        Nervous and Auditory    Multiple sclerosis - Primary     Significant improvement after two infusions    Pt JCV ab positive and in Touch REMS program    RTC March 24             Musculoskeletal and Integument    Spasticity     Improved with Keppra but still present  Increase Keppra XR 1500 mg qday

## 2017-02-28 NOTE — ASSESSMENT & PLAN NOTE
Significant improvement after two infusions    Pt JCV ab positive and in Touch REMS program    RTC March 24

## 2017-03-02 ENCOUNTER — APPOINTMENT (OUTPATIENT)
Dept: PHYSICAL THERAPY | Facility: HOSPITAL | Age: 32
End: 2017-03-02

## 2017-03-27 ENCOUNTER — OFFICE VISIT (OUTPATIENT)
Dept: NEUROLOGY | Facility: CLINIC | Age: 32
End: 2017-03-27

## 2017-03-27 VITALS
SYSTOLIC BLOOD PRESSURE: 118 MMHG | BODY MASS INDEX: 20.92 KG/M2 | HEIGHT: 74 IN | DIASTOLIC BLOOD PRESSURE: 80 MMHG | WEIGHT: 163 LBS

## 2017-03-27 DIAGNOSIS — R25.2 SPASTICITY: ICD-10-CM

## 2017-03-27 DIAGNOSIS — G35 MULTIPLE SCLEROSIS (HCC): Primary | ICD-10-CM

## 2017-03-27 PROCEDURE — 99213 OFFICE O/P EST LOW 20 MIN: CPT | Performed by: PSYCHIATRY & NEUROLOGY

## 2017-03-27 NOTE — PROGRESS NOTES
Subjective:     Patient ID: Shane Hernandez is a 32 y.o. male.    History of Present Illness     30 yo male with RRMS and LE spasticity returns in follow up.  Last visit on 2/28/17 increased Keppra for spasticity and continued Tysabri.    JCV ab index 2.44    RRMS     Three infusions of Tysabri with mild flu like sx afterwards.  Fatigue is mild.  Started working last night and able to make it through shift.  Sitting down at breaks does have some numbness in legs and increased stiffness.        LE spasticity    Stiffness and cramping in legs controlled with Keppra.      Problem history:    2016 developed numbness in left foot for 2 weeks then resolved.  12/19/16 LE started feeling weak and N/T started in anterior thighs.  Worsened to point where standing for over 5 minutes causes severe pain and cramping in LE from waist down.  Significant constipation but no bladder difficulty.  Reports perineal numbness.  Steroids and GBP of minimal benefit.   Works as  and required to stand all day.      Reviewed records from [facility] admission 12/26/16 - 12/30/16:    Presented to ED on 12/26/16 for numbness in bilateral thighs with periodic spasms of pain for 30 - 45 seconds.  Sx started 1 wk prior to admission.  Dr Art consulted and prescribed GBP, ordered LP.  Treated with IV Solu medrol     MRI;      L/S - unremakable  C-spine - unremarkable  T-spine - T4; T7/8 lesions no enhancement  Brain - right MCP, pontine, CC, numerous PVWM lesions    Labs:  CSF:  IgG synthesis rate 23.7; 9 OCB, Prot 90   NMO - neg    The following portions of the patient's history were reviewed and updated as appropriate: allergies, current medications, past family history, past medical history, past social history, past surgical history and problem list.    Review of Systems   Constitutional: Positive for fatigue. Negative for activity change and unexpected weight change.   HENT: Negative for facial swelling, hearing loss, tinnitus, trouble  swallowing and voice change.    Eyes: Negative for photophobia, pain and visual disturbance.   Respiratory: Negative for apnea and choking.    Gastrointestinal: Negative for constipation.   Endocrine: Positive for heat intolerance. Negative for cold intolerance.   Genitourinary: Negative for difficulty urinating, frequency and urgency.   Musculoskeletal: Positive for back pain and gait problem. Negative for neck stiffness.   Allergic/Immunologic: Negative for immunocompromised state.   Neurological: Positive for tremors. Negative for dizziness, seizures, syncope, facial asymmetry, speech difficulty and light-headedness.   Hematological: Negative for adenopathy.   Psychiatric/Behavioral: Negative for confusion, decreased concentration, hallucinations and sleep disturbance. The patient is not nervous/anxious.         Objective:    Neurologic Exam     Mental Status   Registration: recalls 3 of 3 objects. Recall at 5 minutes: recalls 3 of 3 objects. Follows 3 step commands.   Attention: normal. Concentration: normal.   Level of consciousness: alert  Knowledge: good and consistent with education.   Able to name object. Able to read. Able to repeat. Able to write. Normal comprehension.     Cranial Nerves     CN II   Visual fields full to confrontation.   Visual acuity: normal  Right visual field deficit: none  Left visual field deficit: none     CN III, IV, VI   Right pupil: Shape: regular. Reactivity: brisk. Consensual response: intact.   Left pupil: Shape: regular. Reactivity: brisk. Consensual response: intact.   Nystagmus: none   Diplopia: none  Ophthalmoparesis: none  Upgaze: normal  Downgaze: normal  Conjugate gaze: present  Vestibulo-ocular reflex: present    CN V   Facial sensation intact.   Right corneal reflex: normal  Left corneal reflex: normal    CN VII   Right facial weakness: none  Left facial weakness: none    CN VIII   Hearing: intact    CN IX, X   Palate: symmetric  Right gag reflex: normal  Left gag  reflex: normal    CN XI   Right sternocleidomastoid strength: normal  Left sternocleidomastoid strength: normal    CN XII   Tongue: not atrophic  Fasciculations: absent  Tongue deviation: none    Motor Exam   Muscle bulk: normal  Overall muscle tone: normal  Right arm tone: normal  Left arm tone: normal  Right leg tone: spastic  Left leg tone: spastic    Sensory Exam   Light touch normal.   Right arm light touch: normal  Left arm light touch: normal  Right arm vibration: normal  Left arm vibration: normal  Right arm proprioception: normal  Left arm proprioception: normal  Right arm pinprick: normal  Left arm pinprick: normal  Lowest sensation to pinprick on right: T10  Lowest sensation to pinprick on left: T10  Lowest sensation to vibration on right: T10  Lowest sensation to vibration on left: T10    Gait, Coordination, and Reflexes     Gait  Gait: normal    Tremor   Resting tremor: absent  Intention tremor: absent  Action tremor: absent    Reflexes   Reflexes 2+ except as noted.   Right patellar: 3+  Left patellar: 3+  Right achilles: 3+  Left achilles: 3+  Right ankle clonus: present  Left ankle clonus: present      Physical Exam   Constitutional: He appears well-developed and well-nourished.   Neurological: Gait normal.   Reflex Scores:       Patellar reflexes are 3+ on the right side and 3+ on the left side.       Achilles reflexes are 3+ on the right side and 3+ on the left side.  Nursing note and vitals reviewed.      Assessment/Plan:       Problems Addressed this Visit        Nervous and Auditory    Multiple sclerosis - Primary     JCV index 2.44  Three Tysabri infusions with improvement and returned to work    Plan to switch to Ocrevus in 3 months.                Musculoskeletal and Integument    Spasticity     Improved with Keppra XR 1500 mg qday

## 2017-05-05 ENCOUNTER — APPOINTMENT (OUTPATIENT)
Dept: LAB | Facility: HOSPITAL | Age: 32
End: 2017-05-05

## 2017-05-05 ENCOUNTER — OFFICE VISIT (OUTPATIENT)
Dept: NEUROLOGY | Facility: CLINIC | Age: 32
End: 2017-05-05

## 2017-05-05 VITALS
HEART RATE: 83 BPM | BODY MASS INDEX: 19.66 KG/M2 | OXYGEN SATURATION: 97 % | SYSTOLIC BLOOD PRESSURE: 128 MMHG | HEIGHT: 74 IN | WEIGHT: 153.2 LBS | DIASTOLIC BLOOD PRESSURE: 72 MMHG

## 2017-05-05 DIAGNOSIS — G35 MULTIPLE SCLEROSIS (HCC): Primary | ICD-10-CM

## 2017-05-05 DIAGNOSIS — R25.2 SPASTICITY: ICD-10-CM

## 2017-05-05 LAB
ALBUMIN SERPL-MCNC: 4.4 G/DL (ref 3.2–4.8)
ALBUMIN/GLOB SERPL: 1.7 G/DL (ref 1.5–2.5)
ALP SERPL-CCNC: 78 U/L (ref 25–100)
ALT SERPL W P-5'-P-CCNC: 11 U/L (ref 7–40)
ANION GAP SERPL CALCULATED.3IONS-SCNC: 1 MMOL/L (ref 3–11)
AST SERPL-CCNC: 20 U/L (ref 0–33)
BASOPHILS # BLD AUTO: 0.05 10*3/MM3 (ref 0–0.2)
BASOPHILS NFR BLD AUTO: 0.7 % (ref 0–1)
BILIRUB SERPL-MCNC: 0.7 MG/DL (ref 0.3–1.2)
BUN BLD-MCNC: 10 MG/DL (ref 9–23)
BUN/CREAT SERPL: 11.1 (ref 7–25)
CALCIUM SPEC-SCNC: 10 MG/DL (ref 8.7–10.4)
CHLORIDE SERPL-SCNC: 110 MMOL/L (ref 99–109)
CO2 SERPL-SCNC: 31 MMOL/L (ref 20–31)
CREAT BLD-MCNC: 0.9 MG/DL (ref 0.6–1.3)
DEPRECATED RDW RBC AUTO: 43.9 FL (ref 37–54)
EOSINOPHIL # BLD AUTO: 0.55 10*3/MM3 (ref 0.1–0.3)
EOSINOPHIL NFR BLD AUTO: 7.3 % (ref 0–3)
ERYTHROCYTE [DISTWIDTH] IN BLOOD BY AUTOMATED COUNT: 13.4 % (ref 11.3–14.5)
GFR SERPL CREATININE-BSD FRML MDRD: 117 ML/MIN/1.73
GLOBULIN UR ELPH-MCNC: 2.6 GM/DL
GLUCOSE BLD-MCNC: 87 MG/DL (ref 70–100)
HAV IGM SERPL QL IA: NORMAL
HBV CORE IGM SERPL QL IA: NORMAL
HBV SURFACE AG SERPL QL IA: NORMAL
HCT VFR BLD AUTO: 42 % (ref 38.9–50.9)
HCV AB SER DONR QL: NORMAL
HGB BLD-MCNC: 13.8 G/DL (ref 13.1–17.5)
IMM GRANULOCYTES # BLD: 0.02 10*3/MM3 (ref 0–0.03)
IMM GRANULOCYTES NFR BLD: 0.3 % (ref 0–0.6)
LYMPHOCYTES # BLD AUTO: 3.07 10*3/MM3 (ref 0.6–4.8)
LYMPHOCYTES NFR BLD AUTO: 40.6 % (ref 24–44)
MCH RBC QN AUTO: 29.6 PG (ref 27–31)
MCHC RBC AUTO-ENTMCNC: 32.9 G/DL (ref 32–36)
MCV RBC AUTO: 89.9 FL (ref 80–99)
MONOCYTES # BLD AUTO: 0.74 10*3/MM3 (ref 0–1)
MONOCYTES NFR BLD AUTO: 9.8 % (ref 0–12)
NEUTROPHILS # BLD AUTO: 3.13 10*3/MM3 (ref 1.5–8.3)
NEUTROPHILS NFR BLD AUTO: 41.3 % (ref 41–71)
PLATELET # BLD AUTO: 236 10*3/MM3 (ref 150–450)
PMV BLD AUTO: 10.1 FL (ref 6–12)
POTASSIUM BLD-SCNC: 3.8 MMOL/L (ref 3.5–5.5)
PROT SERPL-MCNC: 7 G/DL (ref 5.7–8.2)
RBC # BLD AUTO: 4.67 10*6/MM3 (ref 4.2–5.76)
SODIUM BLD-SCNC: 142 MMOL/L (ref 132–146)
WBC NRBC COR # BLD: 7.56 10*3/MM3 (ref 3.5–10.8)

## 2017-05-05 PROCEDURE — 86701 HIV-1ANTIBODY: CPT | Performed by: PSYCHIATRY & NEUROLOGY

## 2017-05-05 PROCEDURE — 86702 HIV-2 ANTIBODY: CPT | Performed by: PSYCHIATRY & NEUROLOGY

## 2017-05-05 PROCEDURE — 80074 ACUTE HEPATITIS PANEL: CPT | Performed by: PSYCHIATRY & NEUROLOGY

## 2017-05-05 PROCEDURE — 36415 COLL VENOUS BLD VENIPUNCTURE: CPT | Performed by: PSYCHIATRY & NEUROLOGY

## 2017-05-05 PROCEDURE — 80053 COMPREHEN METABOLIC PANEL: CPT | Performed by: PSYCHIATRY & NEUROLOGY

## 2017-05-05 PROCEDURE — 99214 OFFICE O/P EST MOD 30 MIN: CPT | Performed by: PSYCHIATRY & NEUROLOGY

## 2017-05-05 PROCEDURE — 86480 TB TEST CELL IMMUN MEASURE: CPT | Performed by: PSYCHIATRY & NEUROLOGY

## 2017-05-05 PROCEDURE — 85025 COMPLETE CBC W/AUTO DIFF WBC: CPT | Performed by: PSYCHIATRY & NEUROLOGY

## 2017-05-05 RX ORDER — GABAPENTIN 400 MG/1
800 CAPSULE ORAL 3 TIMES DAILY
Qty: 180 CAPSULE | Refills: 5 | Status: SHIPPED | OUTPATIENT
Start: 2017-05-05

## 2017-05-08 LAB
HIV 1 & 2 AB SERPLBLD IA.RAPID: NORMAL
HIV 2 AB SERPLBLD QL IA.RAPID: NEGATIVE
HIV1 AB SERPLBLD QL IA.RAPID: NEGATIVE
VZV IGG SER IA-ACNC: >4000 INDEX

## 2017-05-14 LAB
INTERPRETATION: NORMAL
M TB TUBERC IFN-G BLD QL: NEGATIVE
QFT TB AG MINUS NIL VALUE: 0.03 IU/ML
QUANTIFERON CRITERIA: NORMAL
QUANTIFERON MITOGEN VALUE: >10 IU/ML
QUANTIFERON NIL VALUE: 0.06 IU/ML
QUANTIFERON TB AG VALUE: 0.09 IU/ML

## 2017-05-19 ENCOUNTER — OFFICE VISIT (OUTPATIENT)
Dept: NEUROLOGY | Facility: CLINIC | Age: 32
End: 2017-05-19

## 2017-05-19 VITALS
HEART RATE: 82 BPM | OXYGEN SATURATION: 98 % | DIASTOLIC BLOOD PRESSURE: 76 MMHG | BODY MASS INDEX: 19.02 KG/M2 | SYSTOLIC BLOOD PRESSURE: 120 MMHG | HEIGHT: 74 IN | WEIGHT: 148.2 LBS

## 2017-05-19 DIAGNOSIS — R25.2 SPASTICITY: ICD-10-CM

## 2017-05-19 DIAGNOSIS — G35 MULTIPLE SCLEROSIS (HCC): Primary | ICD-10-CM

## 2017-05-19 PROCEDURE — 99214 OFFICE O/P EST MOD 30 MIN: CPT | Performed by: PSYCHIATRY & NEUROLOGY

## 2017-05-19 RX ORDER — OXCARBAZEPINE 300 MG/1
600 TABLET, FILM COATED ORAL 2 TIMES DAILY
Qty: 120 TABLET | Refills: 11 | Status: SHIPPED | OUTPATIENT
Start: 2017-05-19 | End: 2017-09-13

## 2017-06-19 RX ORDER — DIPHENHYDRAMINE HCL 25 MG
25 CAPSULE ORAL ONCE
Status: CANCELLED | OUTPATIENT
Start: 2017-06-19

## 2017-06-19 RX ORDER — SODIUM CHLORIDE 9 MG/ML
250 INJECTION, SOLUTION INTRAVENOUS ONCE
Status: CANCELLED | OUTPATIENT
Start: 2017-06-19

## 2017-06-19 RX ORDER — ACETAMINOPHEN 325 MG/1
650 TABLET ORAL ONCE
Status: CANCELLED | OUTPATIENT
Start: 2017-06-19

## 2017-07-10 ENCOUNTER — OFFICE VISIT (OUTPATIENT)
Dept: NEUROLOGY | Facility: CLINIC | Age: 32
End: 2017-07-10

## 2017-07-10 VITALS
HEART RATE: 98 BPM | DIASTOLIC BLOOD PRESSURE: 72 MMHG | HEIGHT: 74 IN | SYSTOLIC BLOOD PRESSURE: 118 MMHG | WEIGHT: 146.6 LBS | BODY MASS INDEX: 18.82 KG/M2

## 2017-07-10 DIAGNOSIS — G35 MULTIPLE SCLEROSIS (HCC): Primary | ICD-10-CM

## 2017-07-10 DIAGNOSIS — R25.2 SPASTICITY: ICD-10-CM

## 2017-07-10 PROCEDURE — 99214 OFFICE O/P EST MOD 30 MIN: CPT | Performed by: PSYCHIATRY & NEUROLOGY

## 2017-07-10 NOTE — PROGRESS NOTES
Subjective:     Patient ID: Shane Hernandez is a 32 y.o. male.    History of Present Illness     30 yo male with RRMS and LE spasticity returns in follow up.  Last visit on 5/19/17 continued Keppra for spasticity and Tysabri, recommending stopping work and discussed Lemtrada and ordered pre labs.    Pre labs unremarkable  HIV, Hepatitis, CBC, CMP, VZV ab, TB  JCV ab index 2.19  5/19/17    RRMS     Last week prior to Tysabri N/T returning with stiffness and heaviness in legs.  Fatigue is moderate.  Increased sleeping.  Heat intolerance not present.   N/T continues in legs up to hips.      Sx in hands of feeling hard/fiery/cold resolved.      Six infusions of Tysabri.      LE spasticity    Stiffness and cramping in legs returning last week of Tysabri.  Keppra decreasing sx..     Problem history:    2016 developed numbness in left foot for 2 weeks then resolved.  12/19/16 LE started feeling weak and N/T started in anterior thighs.  Worsened to point where standing for over 5 minutes causes severe pain and cramping in LE from waist down.  Significant constipation but no bladder difficulty.  Reports perineal numbness.  Steroids and GBP of minimal benefit.   Works as  and required to stand all day.      Reviewed records from [facility] admission 12/26/16 - 12/30/16:    Presented to ED on 12/26/16 for numbness in bilateral thighs with periodic spasms of pain for 30 - 45 seconds.  Sx started 1 wk prior to admission.  Dr Art consulted and prescribed GBP, ordered LP.  Treated with IV Solu medrol     MRI;      L/S - unremakable  C-spine - unremarkable  T-spine - T4; T7/8 lesions no enhancement  Brain - right MCP, pontine, CC, numerous PVWM lesions    Labs:  CSF:  IgG synthesis rate 23.7; 9 OCB, Prot 90   NMO - neg    The following portions of the patient's history were reviewed and updated as appropriate: allergies, current medications, past family history, past medical history, past social history, past surgical  "history and problem list.    Review of Systems   Constitutional: Negative for activity change and unexpected weight change.   HENT: Negative for facial swelling, hearing loss, tinnitus, trouble swallowing and voice change.    Eyes: Negative for photophobia, pain and visual disturbance.   Respiratory: Negative for apnea and choking.    Gastrointestinal: Negative for constipation.   Endocrine: Positive for heat intolerance. Negative for cold intolerance.   Genitourinary: Negative for difficulty urinating, frequency and urgency.   Musculoskeletal: Positive for back pain and gait problem. Negative for neck stiffness.   Allergic/Immunologic: Negative for immunocompromised state.   Neurological: Positive for tremors. Negative for dizziness, seizures, syncope, facial asymmetry, speech difficulty and light-headedness.   Hematological: Negative for adenopathy.   Psychiatric/Behavioral: Negative for confusion, decreased concentration, hallucinations and sleep disturbance. The patient is not nervous/anxious.         Objective:  Vitals:    07/10/17 0901   BP: 118/72   Pulse: 98   Weight: 146 lb 9.6 oz (66.5 kg)   Height: 74\" (188 cm)       Neurologic Exam     Mental Status   Registration: recalls 3 of 3 objects. Recall at 5 minutes: recalls 3 of 3 objects. Follows 3 step commands.   Attention: normal. Concentration: normal.   Level of consciousness: alert  Knowledge: good and consistent with education.   Able to name object. Able to read. Able to repeat. Able to write. Normal comprehension.     Cranial Nerves     CN II   Visual fields full to confrontation.   Visual acuity: normal  Right visual field deficit: none  Left visual field deficit: none     CN III, IV, VI   Right pupil: Shape: regular. Reactivity: brisk. Consensual response: intact.   Left pupil: Shape: regular. Reactivity: brisk. Consensual response: intact.   Nystagmus: none   Diplopia: none  Ophthalmoparesis: none  Upgaze: normal  Downgaze: normal  Conjugate gaze: " present  Vestibulo-ocular reflex: present    CN V   Facial sensation intact.   Right corneal reflex: normal  Left corneal reflex: normal    CN VII   Right facial weakness: none  Left facial weakness: none    CN VIII   Hearing: intact    CN IX, X   Palate: symmetric  Right gag reflex: normal  Left gag reflex: normal    CN XI   Right sternocleidomastoid strength: normal  Left sternocleidomastoid strength: normal    CN XII   Tongue: not atrophic  Fasciculations: absent  Tongue deviation: none    Motor Exam   Muscle bulk: normal  Overall muscle tone: normal  Right arm tone: normal  Left arm tone: normal  Right leg tone: spastic  Left leg tone: spastic    Sensory Exam   Light touch normal.   Right arm light touch: normal  Left arm light touch: normal  Right arm vibration: normal  Left arm vibration: normal  Right arm proprioception: normal  Left arm proprioception: normal  Right arm pinprick: normal  Left arm pinprick: normal  Lowest sensation to pinprick on right: T10  Lowest sensation to pinprick on left: T10  Lowest sensation to vibration on right: T10  Lowest sensation to vibration on left: T10    Gait, Coordination, and Reflexes     Gait  Gait: normal    Tremor   Resting tremor: absent  Intention tremor: absent  Action tremor: absent    Reflexes   Reflexes 2+ except as noted.   Right patellar: 3+  Left patellar: 3+  Right achilles: 3+  Left achilles: 3+  Right ankle clonus: present  Left ankle clonus: present      Physical Exam   Constitutional: He appears well-developed and well-nourished.   Neurological: Gait normal.   Reflex Scores:       Patellar reflexes are 3+ on the right side and 3+ on the left side.       Achilles reflexes are 3+ on the right side and 3+ on the left side.  Nursing note and vitals reviewed.    Office Visit on 05/05/2017   Component Date Value Ref Range Status   • Hepatitis B Surface Ag 05/05/2017 Non-Reactive  Non-Reactive Final   • Hep A IgM 05/05/2017 Non-Reactive  Non-Reactive Final   •  Hep B C IgM 05/05/2017 Non-Reactive  Non-Reactive Final   • Hepatitis C Ab 05/05/2017 Non-Reactive  Non-Reactive Final   • QuantiFERON-TB Gold In Tube 05/05/2017 Negative  Negative Final    The specimen received for QuantiFERON testing was incubated by the  ordering institution.  Specific procedures outlined in our Directory  of Services and in the package insert for the QuantiFERON Gold  (In Tube) test must be followed to enable for proper stimulation of  cells for the production of interferon gamma.   • QuantiFERON Criteria 05/05/2017 Comment   Final    To be considered positive a specimen should have a TB Ag minus Nil  value greater than or equal to 0.35 IU/mL and in addition the TB Ag  minus Nil value must be greater than or equal to 25% of the Nil  value. There may be insufficient information in these values to  differentiate between some negative and some indeterminate test  values.   • QuantiFERON TB Ag Value 05/05/2017 0.09  IU/mL Final   • QuantiFERON Nil Value 05/05/2017 0.06  IU/mL Final   • QuantiFERON Mitogen Value 05/05/2017 >10.00  IU/mL Final   • QFT TB Ag minus Nil Value 05/05/2017 0.03  IU/mL Final   • Interpretation 05/05/2017 Comment   Final    The QuantiFERON TB Gold (in Tube) assay is intended for use as an aid  in the diagnosis of TB infection. Negative results suggest that there  is no TB infection. In patients with high suspicion of exposure, a  negative test should be repeated. A positive test indicates infection  with Mycobacterium tuberculosis. Among individuals without  tuberculosis infection, a positive test may be due to exposure to  M. kansasii, M. szulgai or M. marinum. On the Internet, go to  cdc.gov/tb for further details.   • Varicella IgG 05/05/2017 >4000  Immune >165 index Final                                   Negative          <135                                 Equivocal    135 - 165                                 Positive          >165  A positive result generally  indicates exposure to the  pathogen or administration of specific immunoglobulins,  but it is not indication of active infection or stage  of disease.   • Glucose 05/05/2017 87  70 - 100 mg/dL Final   • BUN 05/05/2017 10  9 - 23 mg/dL Final   • Creatinine 05/05/2017 0.90  0.60 - 1.30 mg/dL Final   • Sodium 05/05/2017 142  132 - 146 mmol/L Final   • Potassium 05/05/2017 3.8  3.5 - 5.5 mmol/L Final   • Chloride 05/05/2017 110* 99 - 109 mmol/L Final   • CO2 05/05/2017 31.0  20.0 - 31.0 mmol/L Final   • Calcium 05/05/2017 10.0  8.7 - 10.4 mg/dL Final   • Total Protein 05/05/2017 7.0  5.7 - 8.2 g/dL Final   • Albumin 05/05/2017 4.40  3.20 - 4.80 g/dL Final   • ALT (SGPT) 05/05/2017 11  7 - 40 U/L Final   • AST (SGOT) 05/05/2017 20  0 - 33 U/L Final   • Alkaline Phosphatase 05/05/2017 78  25 - 100 U/L Final   • Total Bilirubin 05/05/2017 0.7  0.3 - 1.2 mg/dL Final   • eGFR   Amer 05/05/2017 117  >60 mL/min/1.73 Final   • Globulin 05/05/2017 2.6  gm/dL Final   • A/G Ratio 05/05/2017 1.7  1.5 - 2.5 g/dL Final   • BUN/Creatinine Ratio 05/05/2017 11.1  7.0 - 25.0 Final   • Anion Gap 05/05/2017 1.0* 3.0 - 11.0 mmol/L Final   • HIV-1 Abs-EIA 05/05/2017 Negative  Negative Final   • HIV-2 Ab 05/05/2017 Negative  Negative Final   • HIV 1/2 Final Interpretation 05/05/2017 Comment   Final    Negative for HIV-1 and HIV-2 antibodies  Follow-up testing for HIV-1 RNA, test [id number] is recommended  on a new specimen.   • WBC 05/05/2017 7.56  3.50 - 10.80 10*3/mm3 Final   • RBC 05/05/2017 4.67  4.20 - 5.76 10*6/mm3 Final   • Hemoglobin 05/05/2017 13.8  13.1 - 17.5 g/dL Final   • Hematocrit 05/05/2017 42.0  38.9 - 50.9 % Final   • MCV 05/05/2017 89.9  80.0 - 99.0 fL Final   • MCH 05/05/2017 29.6  27.0 - 31.0 pg Final   • MCHC 05/05/2017 32.9  32.0 - 36.0 g/dL Final   • RDW 05/05/2017 13.4  11.3 - 14.5 % Final   • RDW-SD 05/05/2017 43.9  37.0 - 54.0 fl Final   • MPV 05/05/2017 10.1  6.0 - 12.0 fL Final   • Platelets 05/05/2017 236  150  - 450 10*3/mm3 Final   • Neutrophil % 05/05/2017 41.3  41.0 - 71.0 % Final   • Lymphocyte % 05/05/2017 40.6  24.0 - 44.0 % Final   • Monocyte % 05/05/2017 9.8  0.0 - 12.0 % Final   • Eosinophil % 05/05/2017 7.3* 0.0 - 3.0 % Final   • Basophil % 05/05/2017 0.7  0.0 - 1.0 % Final   • Immature Grans % 05/05/2017 0.3  0.0 - 0.6 % Final   • Neutrophils, Absolute 05/05/2017 3.13  1.50 - 8.30 10*3/mm3 Final   • Lymphocytes, Absolute 05/05/2017 3.07  0.60 - 4.80 10*3/mm3 Final   • Monocytes, Absolute 05/05/2017 0.74  0.00 - 1.00 10*3/mm3 Final   • Eosinophils, Absolute 05/05/2017 0.55* 0.10 - 0.30 10*3/mm3 Final   • Basophils, Absolute 05/05/2017 0.05  0.00 - 0.20 10*3/mm3 Final   • Immature Grans, Absolute 05/05/2017 0.02  0.00 - 0.03 10*3/mm3 Final       Assessment/Plan:       Problems Addressed this Visit        Nervous and Auditory    Multiple sclerosis - Primary     New insurance is denying Tysabri  Start Tecfidera   If fails will tx to Lemtrada              Musculoskeletal and Integument    Spasticity     Stable on Keppra and OXC

## 2017-07-31 ENCOUNTER — RESULTS ENCOUNTER (OUTPATIENT)
Dept: NEUROLOGY | Facility: CLINIC | Age: 32
End: 2017-07-31

## 2017-07-31 ENCOUNTER — OFFICE VISIT (OUTPATIENT)
Dept: NEUROLOGY | Facility: CLINIC | Age: 32
End: 2017-07-31

## 2017-07-31 ENCOUNTER — APPOINTMENT (OUTPATIENT)
Dept: LAB | Facility: HOSPITAL | Age: 32
End: 2017-07-31

## 2017-07-31 VITALS
BODY MASS INDEX: 18.74 KG/M2 | HEART RATE: 82 BPM | DIASTOLIC BLOOD PRESSURE: 78 MMHG | HEIGHT: 74 IN | SYSTOLIC BLOOD PRESSURE: 124 MMHG | OXYGEN SATURATION: 99 % | WEIGHT: 146 LBS

## 2017-07-31 DIAGNOSIS — G35 MULTIPLE SCLEROSIS (HCC): Primary | ICD-10-CM

## 2017-07-31 DIAGNOSIS — R25.2 SPASTICITY: ICD-10-CM

## 2017-07-31 DIAGNOSIS — G35 MULTIPLE SCLEROSIS (HCC): ICD-10-CM

## 2017-07-31 PROCEDURE — 99214 OFFICE O/P EST MOD 30 MIN: CPT | Performed by: PSYCHIATRY & NEUROLOGY

## 2017-07-31 RX ORDER — PREDNISONE 20 MG/1
TABLET ORAL
Qty: 12 TABLET | Refills: 0 | Status: SHIPPED | OUTPATIENT
Start: 2017-07-31 | End: 2017-08-18

## 2017-08-01 RX ORDER — SODIUM CHLORIDE 9 MG/ML
250 INJECTION, SOLUTION INTRAVENOUS ONCE
Status: CANCELLED | OUTPATIENT
Start: 2017-08-01

## 2017-08-01 RX ORDER — FAMOTIDINE 10 MG/ML
20 INJECTION, SOLUTION INTRAVENOUS EVERY 6 HOURS PRN
Status: CANCELLED | OUTPATIENT
Start: 2017-08-01

## 2017-08-01 RX ORDER — DIPHENHYDRAMINE HYDROCHLORIDE 50 MG/ML
25 INJECTION INTRAMUSCULAR; INTRAVENOUS ONCE
Status: CANCELLED | OUTPATIENT
Start: 2017-08-01

## 2017-08-01 RX ORDER — ACETAMINOPHEN 325 MG/1
650 TABLET ORAL ONCE
Status: CANCELLED | OUTPATIENT
Start: 2017-08-01

## 2017-08-01 RX ORDER — ONDANSETRON 2 MG/ML
8 INJECTION INTRAMUSCULAR; INTRAVENOUS EVERY 8 HOURS PRN
Status: CANCELLED | OUTPATIENT
Start: 2017-08-01

## 2017-08-03 LAB
ALBUMIN SERPL-MCNC: 4.3 G/DL (ref 3.5–5.5)
ALBUMIN/GLOB SERPL: 1.8 {RATIO} (ref 1.2–2.2)
ALP SERPL-CCNC: 78 IU/L (ref 39–117)
ALT SERPL-CCNC: 17 IU/L (ref 0–44)
ANNOTATION COMMENT IMP: ABNORMAL
APPEARANCE UR: CLEAR
AST SERPL-CCNC: 22 IU/L (ref 0–40)
BACTERIA #/AREA URNS HPF: NORMAL /[HPF]
BASOPHILS # BLD AUTO: 0.1 X10E3/UL (ref 0–0.2)
BASOPHILS NFR BLD AUTO: 1 %
BILIRUB SERPL-MCNC: <0.2 MG/DL (ref 0–1.2)
BILIRUB UR QL STRIP: NEGATIVE
BUN SERPL-MCNC: 10 MG/DL (ref 6–20)
BUN/CREAT SERPL: 11 (ref 9–20)
CALCIUM SERPL-MCNC: 9.6 MG/DL (ref 8.7–10.2)
CHLORIDE SERPL-SCNC: 105 MMOL/L (ref 96–106)
CO2 SERPL-SCNC: 25 MMOL/L (ref 18–29)
COLOR UR: YELLOW
CREAT SERPL-MCNC: 0.95 MG/DL (ref 0.76–1.27)
EOSINOPHIL # BLD AUTO: 0.7 X10E3/UL (ref 0–0.4)
EOSINOPHIL NFR BLD AUTO: 9 %
EPI CELLS #/AREA URNS HPF: NORMAL /HPF
ERYTHROCYTE [DISTWIDTH] IN BLOOD BY AUTOMATED COUNT: 14.3 % (ref 12.3–15.4)
GAMMA INTERFERON BACKGROUND BLD IA-ACNC: 0.02 IU/ML
GLOBULIN SER CALC-MCNC: 2.4 G/DL (ref 1.5–4.5)
GLUCOSE SERPL-MCNC: 88 MG/DL (ref 65–99)
GLUCOSE UR QL: NEGATIVE
HAV IGM SERPL QL IA: NEGATIVE
HBV CORE IGM SERPL QL IA: NEGATIVE
HBV SURFACE AG SERPL QL IA: NEGATIVE
HCT VFR BLD AUTO: 39.9 % (ref 37.5–51)
HCV AB S/CO SERPL IA: <0.1 S/CO RATIO (ref 0–0.9)
HGB BLD-MCNC: 13.7 G/DL (ref 12.6–17.7)
HGB UR QL STRIP: NEGATIVE
HIV 1+2 AB+HIV1 P24 AG SERPL QL IA: NON REACTIVE
IMM GRANULOCYTES # BLD: 0 X10E3/UL (ref 0–0.1)
IMM GRANULOCYTES NFR BLD: 0 %
KETONES UR QL STRIP: NEGATIVE
LEUKOCYTE ESTERASE UR QL STRIP: NEGATIVE
LYMPHOCYTES # BLD AUTO: 2.2 X10E3/UL (ref 0.7–3.1)
LYMPHOCYTES NFR BLD AUTO: 29 %
M TB IFN-G BLD-IMP: ABNORMAL
M TB IFN-G CD4+ BCKGRND COR BLD-ACNC: 0.01 IU/ML
M TB IFN-G CD4+ T-CELLS BLD-ACNC: 0.03 IU/ML
MCH RBC QN AUTO: 29.1 PG (ref 26.6–33)
MCHC RBC AUTO-ENTMCNC: 34.3 G/DL (ref 31.5–35.7)
MCV RBC AUTO: 85 FL (ref 79–97)
MICRO URNS: (no result)
MICRO URNS: (no result)
MITOGEN IGNF BLD-ACNC: 0.1 IU/ML
MONOCYTES # BLD AUTO: 0.9 X10E3/UL (ref 0.1–0.9)
MONOCYTES NFR BLD AUTO: 12 %
MUCOUS THREADS URNS QL MICRO: PRESENT
NEUTROPHILS # BLD AUTO: 3.8 X10E3/UL (ref 1.4–7)
NEUTROPHILS NFR BLD AUTO: 49 %
NITRITE UR QL STRIP: NEGATIVE
PH UR STRIP: 7 [PH] (ref 5–7.5)
PLATELET # BLD AUTO: 247 X10E3/UL (ref 150–379)
POTASSIUM SERPL-SCNC: 4.5 MMOL/L (ref 3.5–5.2)
PROT SERPL-MCNC: 6.7 G/DL (ref 6–8.5)
PROT UR QL STRIP: NEGATIVE
RBC # BLD AUTO: 4.7 X10E6/UL (ref 4.14–5.8)
RBC #/AREA URNS HPF: NORMAL /HPF
SERVICE CMNT-IMP: ABNORMAL
SODIUM SERPL-SCNC: 144 MMOL/L (ref 134–144)
SP GR UR: 1.01 (ref 1–1.03)
TSH SERPL DL<=0.005 MIU/L-ACNC: 0.68 UIU/ML (ref 0.45–4.5)
UROBILINOGEN UR STRIP-MCNC: 0.2 MG/DL (ref 0.2–1)
VZV IGG SER IA-ACNC: >4000 INDEX
WBC # BLD AUTO: 7.6 X10E3/UL (ref 3.4–10.8)
WBC #/AREA URNS HPF: NORMAL /HPF

## 2017-08-05 ENCOUNTER — RESULTS ENCOUNTER (OUTPATIENT)
Dept: NEUROLOGY | Facility: CLINIC | Age: 32
End: 2017-08-05

## 2017-08-05 DIAGNOSIS — R25.2 SPASTICITY: ICD-10-CM

## 2017-08-05 DIAGNOSIS — G35 MULTIPLE SCLEROSIS (HCC): ICD-10-CM

## 2017-08-14 ENCOUNTER — APPOINTMENT (OUTPATIENT)
Dept: ONCOLOGY | Facility: HOSPITAL | Age: 32
End: 2017-08-14

## 2017-08-15 ENCOUNTER — APPOINTMENT (OUTPATIENT)
Dept: ONCOLOGY | Facility: HOSPITAL | Age: 32
End: 2017-08-15

## 2017-08-16 ENCOUNTER — APPOINTMENT (OUTPATIENT)
Dept: ONCOLOGY | Facility: HOSPITAL | Age: 32
End: 2017-08-16

## 2017-08-17 ENCOUNTER — APPOINTMENT (OUTPATIENT)
Dept: ONCOLOGY | Facility: HOSPITAL | Age: 32
End: 2017-08-17

## 2017-08-18 ENCOUNTER — APPOINTMENT (OUTPATIENT)
Dept: ONCOLOGY | Facility: HOSPITAL | Age: 32
End: 2017-08-18

## 2017-08-18 ENCOUNTER — OFFICE VISIT (OUTPATIENT)
Dept: NEUROLOGY | Facility: CLINIC | Age: 32
End: 2017-08-18

## 2017-08-18 VITALS
DIASTOLIC BLOOD PRESSURE: 70 MMHG | HEIGHT: 74 IN | WEIGHT: 147 LBS | HEART RATE: 93 BPM | OXYGEN SATURATION: 97 % | BODY MASS INDEX: 18.87 KG/M2 | SYSTOLIC BLOOD PRESSURE: 122 MMHG

## 2017-08-18 DIAGNOSIS — G35 MULTIPLE SCLEROSIS (HCC): Primary | ICD-10-CM

## 2017-08-18 DIAGNOSIS — R25.2 SPASTICITY: ICD-10-CM

## 2017-08-18 PROCEDURE — 99214 OFFICE O/P EST MOD 30 MIN: CPT | Performed by: PSYCHIATRY & NEUROLOGY

## 2017-08-18 RX ORDER — OXCARBAZEPINE 150 MG/1
TABLET, FILM COATED ORAL
COMMUNITY
Start: 2017-08-15

## 2017-08-18 RX ORDER — DIMETHYL FUMARATE 240 MG/1
CAPSULE ORAL
COMMUNITY
Start: 2017-08-07 | End: 2017-08-21

## 2017-08-18 RX ORDER — FLUOXETINE HYDROCHLORIDE 20 MG/1
CAPSULE ORAL
COMMUNITY
Start: 2017-08-15

## 2017-08-18 RX ORDER — METHYLPREDNISOLONE 4 MG/1
TABLET ORAL
Qty: 1 EACH | Refills: 0 | Status: SHIPPED | OUTPATIENT
Start: 2017-08-18 | End: 2017-08-21

## 2017-08-18 RX ORDER — ARIPIPRAZOLE 5 MG/1
TABLET ORAL
COMMUNITY
Start: 2017-08-15

## 2017-08-18 RX ORDER — ACYCLOVIR 400 MG/1
400 TABLET ORAL 2 TIMES DAILY
Qty: 60 TABLET | Refills: 11 | Status: SHIPPED | OUTPATIENT
Start: 2017-08-18 | End: 2017-09-17

## 2017-08-18 NOTE — PROGRESS NOTES
Subjective:     Patient ID: Shane Hernandez is a 32 y.o. male.    History of Present Illness     32 y.o.  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  Last visit on 7/31/17 prescribed Lemtrada and eugenia pre labs, prednisone taper, continued Keppra for spasticity.     HIV, Hepatitis, CBC, CMP, VZV ab, TB - unremarkable  JCV ab index 2.19  5/19/17    RRMS     Interval history:  Admitted to  after for suicide attempt.  Took overdose of AED due to increasing leg weakness and pain.  Started on Abilify and mood improved.  MRI Brain, labs, CSF no acute changes in [facility].      Taking Tecfidera and tolerating without side effects   Fatigue is markedly worsening.  Spasticity increasing in legs.       Six infusions of Tysabri.      LE spasticity    Stiffness and cramping in legs worsening.  Keppra of mild benefit. .     Problem history:    2016 developed numbness in left foot for 2 weeks then resolved.  12/19/16 LE started feeling weak and N/T started in anterior thighs.  Worsened to point where standing for over 5 minutes causes severe pain and cramping in LE from waist down.  Significant constipation but no bladder difficulty.  Reports perineal numbness.  Steroids and GBP of minimal benefit.   Works as  and required to stand all day.      Reviewed records from [facility] admission 12/26/16 - 12/30/16:    Presented to ED on 12/26/16 for numbness in bilateral thighs with periodic spasms of pain for 30 - 45 seconds.  Sx started 1 wk prior to admission.  Dr Art consulted and prescribed GBP, ordered LP.  Treated with IV Solu medrol     MRI;      L/S - unremakable  C-spine - unremarkable  T-spine - T4; T7/8 lesions no enhancement  Brain - right MCP, pontine, CC, numerous PVWM lesions    Labs:  CSF:  IgG synthesis rate 23.7; 9 OCB, Prot 90   NMO - neg    The following portions of the patient's history were reviewed and updated as appropriate: allergies, current medications, past family history, past medical  "history, past social history, past surgical history and problem list.    Review of Systems   Constitutional: Positive for fatigue. Negative for activity change and unexpected weight change.   HENT: Negative for facial swelling, hearing loss, tinnitus, trouble swallowing and voice change.    Eyes: Negative for photophobia, pain and visual disturbance.   Respiratory: Negative for apnea and choking.    Gastrointestinal: Negative for constipation.   Endocrine: Positive for heat intolerance. Negative for cold intolerance.   Genitourinary: Negative for difficulty urinating, frequency and urgency.   Musculoskeletal: Positive for back pain and gait problem. Negative for neck stiffness.   Allergic/Immunologic: Negative for immunocompromised state.   Neurological: Positive for tremors, weakness and numbness. Negative for dizziness, seizures, syncope, facial asymmetry, speech difficulty and light-headedness.   Hematological: Negative for adenopathy.   Psychiatric/Behavioral: Positive for dysphoric mood. Negative for confusion, decreased concentration, hallucinations and sleep disturbance. The patient is nervous/anxious.         Objective:  Vitals:    08/18/17 1051   BP: 122/70   Pulse: 93   SpO2: 97%   Weight: 147 lb (66.7 kg)   Height: 74\" (188 cm)       Neurologic Exam     Mental Status   Registration: recalls 3 of 3 objects. Recall at 5 minutes: recalls 3 of 3 objects. Follows 3 step commands.   Attention: normal. Concentration: normal.   Level of consciousness: alert  Knowledge: good and consistent with education.   Able to name object. Able to read. Able to repeat. Able to write. Normal comprehension.     Cranial Nerves     CN II   Visual fields full to confrontation.   Visual acuity: normal  Right visual field deficit: none  Left visual field deficit: none     CN III, IV, VI   Right pupil: Shape: regular. Reactivity: brisk. Consensual response: intact.   Left pupil: Shape: regular. Reactivity: brisk. Consensual response: " intact.   Nystagmus: none   Diplopia: none  Ophthalmoparesis: none  Upgaze: normal  Downgaze: normal  Conjugate gaze: present  Vestibulo-ocular reflex: present    CN V   Facial sensation intact.   Right corneal reflex: normal  Left corneal reflex: normal    CN VII   Right facial weakness: none  Left facial weakness: none    CN VIII   Hearing: intact    CN IX, X   Palate: symmetric  Right gag reflex: normal  Left gag reflex: normal    CN XI   Right sternocleidomastoid strength: normal  Left sternocleidomastoid strength: normal    CN XII   Tongue: not atrophic  Fasciculations: absent  Tongue deviation: none    Motor Exam   Muscle bulk: normal  Overall muscle tone: normal  Right arm tone: normal  Left arm tone: normal  Right leg tone: spastic  Left leg tone: spastic    Sensory Exam   Light touch normal.   Right arm light touch: normal  Left arm light touch: normal  Right arm vibration: normal  Left arm vibration: normal  Right arm proprioception: normal  Left arm proprioception: normal  Right arm pinprick: normal  Left arm pinprick: normal  Lowest sensation to pinprick on right: T10  Lowest sensation to pinprick on left: T10  Lowest sensation to vibration on right: T10  Lowest sensation to vibration on left: T10    Gait, Coordination, and Reflexes     Gait  Gait: normal    Tremor   Resting tremor: absent  Intention tremor: absent  Action tremor: absent    Reflexes   Reflexes 2+ except as noted.   Right patellar: 3+  Left patellar: 3+  Right achilles: 3+  Left achilles: 3+  Right ankle clonus: present  Left ankle clonus: present      Physical Exam   Constitutional: He appears well-developed and well-nourished.   Neurological: Gait normal.   Reflex Scores:       Patellar reflexes are 3+ on the right side and 3+ on the left side.       Achilles reflexes are 3+ on the right side and 3+ on the left side.  Nursing note and vitals reviewed.    Orders Only on 07/31/2017   Component Date Value Ref Range Status   • WBC  07/31/2017 7.6  3.4 - 10.8 x10E3/uL Final   • RBC 07/31/2017 4.70  4.14 - 5.80 x10E6/uL Final   • Hemoglobin 07/31/2017 13.7  12.6 - 17.7 g/dL Final   • Hematocrit 07/31/2017 39.9  37.5 - 51.0 % Final   • MCV 07/31/2017 85  79 - 97 fL Final   • MCH 07/31/2017 29.1  26.6 - 33.0 pg Final   • MCHC 07/31/2017 34.3  31.5 - 35.7 g/dL Final   • RDW 07/31/2017 14.3  12.3 - 15.4 % Final   • Platelets 07/31/2017 247  150 - 379 x10E3/uL Final   • Neutrophil Rel % 07/31/2017 49  % Final   • Lymphocyte Rel % 07/31/2017 29  % Final   • Monocyte Rel % 07/31/2017 12  % Final   • Eosinophil Rel % 07/31/2017 9  % Final   • Basophil Rel % 07/31/2017 1  % Final   • Neutrophils Absolute 07/31/2017 3.8  1.4 - 7.0 x10E3/uL Final   • Lymphocytes Absolute 07/31/2017 2.2  0.7 - 3.1 x10E3/uL Final   • Monocytes Absolute 07/31/2017 0.9  0.1 - 0.9 x10E3/uL Final   • Eosinophils Absolute 07/31/2017 0.7* 0.0 - 0.4 x10E3/uL Final   • Basophils Absolute 07/31/2017 0.1  0.0 - 0.2 x10E3/uL Final   • Immature Granulocyte Rel % 07/31/2017 0  % Final   • Immature Grans Absolute 07/31/2017 0.0  0.0 - 0.1 x10E3/uL Final   • Glucose 07/31/2017 88  65 - 99 mg/dL Final   • BUN 07/31/2017 10  6 - 20 mg/dL Final   • Creatinine 07/31/2017 0.95  0.76 - 1.27 mg/dL Final   • eGFR Non African Am 07/31/2017 104  >59 mL/min/1.73 Final   • eGFR African Am 07/31/2017 120  >59 mL/min/1.73 Final   • BUN/Creatinine Ratio 07/31/2017 11  9 - 20 Final   • Sodium 07/31/2017 144  134 - 144 mmol/L Final   • Potassium 07/31/2017 4.5  3.5 - 5.2 mmol/L Final   • Chloride 07/31/2017 105  96 - 106 mmol/L Final   • Total CO2 07/31/2017 25  18 - 29 mmol/L Final   • Calcium 07/31/2017 9.6  8.7 - 10.2 mg/dL Final   • Total Protein 07/31/2017 6.7  6.0 - 8.5 g/dL Final   • Albumin 07/31/2017 4.3  3.5 - 5.5 g/dL Final   • Globulin 07/31/2017 2.4  1.5 - 4.5 g/dL Final   • A/G Ratio 07/31/2017 1.8  1.2 - 2.2 Final   • Total Bilirubin 07/31/2017 <0.2  0.0 - 1.2 mg/dL Final   • Alkaline  Phosphatase 07/31/2017 78  39 - 117 IU/L Final   • AST (SGOT) 07/31/2017 22  0 - 40 IU/L Final   • ALT (SGPT) 07/31/2017 17  0 - 44 IU/L Final   • Specific Gravity, UA 07/31/2017 1.010  1.005 - 1.030 Final   • pH, UA 07/31/2017 7.0  5.0 - 7.5 Final   • Color, UA 07/31/2017 Yellow  Yellow Final   • Appearance, UA 07/31/2017 Clear  Clear Final   • Leukocytes, UA 07/31/2017 Negative  Negative Final   • Protein 07/31/2017 Negative  Negative/Trace Final   • Glucose, UA 07/31/2017 Negative  Negative Final   • Ketones 07/31/2017 Negative  Negative Final   • Blood, UA 07/31/2017 Negative  Negative Final   • Bilirubin, UA 07/31/2017 Negative  Negative Final   • Urobilinogen, UA 07/31/2017 0.2  0.2 - 1.0 mg/dL Final   • Nitrite, UA 07/31/2017 Negative  Negative Final   • Microscopic Examination 07/31/2017 Comment   Final    Microscopic follows if indicated.   • MICROSCOPIC EXAMINATION 07/31/2017 See below:   Final    Microscopic was indicated and was performed.   • WBC, UA 07/31/2017 0-5  0 - 5 /hpf Final   • RBC, UA 07/31/2017 None seen  0 - 2 /hpf Final   • Epithelial Cells (non renal) 07/31/2017 None seen  0 - 10 /hpf Final   • Mucus, UA 07/31/2017 Present  Not Estab. Final   • Bacteria, UA 07/31/2017 None seen  None seen/Few Final   • QuantiFERON-TB Gold In Tube 07/31/2017 Indeterminate* Negative Final    Comment: Mitogen (positive control) gave low response.  This may indicate anergy or immune suppression. Early draws and  extended transit time may also result in low positive control and  indeterminate results.  The specimen received for QuantiFERON testing was incubated by the  ordering institution.  Specific procedures outlined in our Directory  of Services and in the package insert for the QuantiFERON Gold  (In Tube) test must be followed to enable for proper stimulation of  cells for the production of interferon gamma.     • QuantiFERON Criteria 07/31/2017 Comment   Final    Comment: To be considered positive a  specimen should have a TB Ag minus Nil  value greater than or equal to 0.35 IU/mL and in addition the TB Ag  minus Nil value must be greater than or equal to 25% of the Nil  value. There may be insufficient information in these values to  differentiate between some negative and some indeterminate test  values.     • QuantiFERON TB Ag Value 07/31/2017 0.03  IU/mL Final   • QuantiFERON Nil Value 07/31/2017 0.02  IU/mL Final   • QuantiFERON Mitogen Value 07/31/2017 0.10  IU/mL Final   • QFT TB Ag minus Nil Value 07/31/2017 0.01  IU/mL Final   • Interpretation 07/31/2017 Comment   Final    Comment: The QuantiFERON TB Gold (in Tube) assay is intended for use as an aid  in the diagnosis of TB infection. Negative results suggest that there  is no TB infection. In patients with high suspicion of exposure, a  negative test should be repeated. A positive test indicates infection  with Mycobacterium tuberculosis. Among individuals without  tuberculosis infection, a positive test may be due to exposure to  M. kansasii, M. szulgai or M. marinum. On the Internet, go to  cdc.gov/tb for further details.     • Hep A IgM 07/31/2017 Negative  Negative Final   • Hepatitis B Surface Ag 07/31/2017 Negative  Negative Final   • Hep B Core IgM 07/31/2017 Negative  Negative Final   • Hep C Virus Ab 07/31/2017 <0.1  0.0 - 0.9 s/co ratio Final    Comment:                                   Negative:     < 0.8                               Indeterminate: 0.8 - 0.9                                    Positive:     > 0.9   The CDC recommends that a positive HCV antibody result   be followed up with a HCV Nucleic Acid Amplification   test (282198).     • HIV SCREEN 4TH GENERATION WRFX (RE* 07/31/2017 Non Reactive  Non Reactive Final   • TSH 07/31/2017 0.678  0.450 - 4.500 uIU/mL Final   • Varicella IgG 07/31/2017 >4000  Immune >165 index Final    Comment:                                Negative          <135                                 Equivocal     135 - 165                                 Positive          >165  A positive result generally indicates exposure to the  pathogen or administration of specific immunoglobulins,  but it is not indication of active infection or stage  of disease.         Assessment/Plan:       Problems Addressed this Visit        Nervous and Auditory    Multiple sclerosis - Primary     Scheduled for Lemtrada on Monday 8/21/17  Start Acyclovir, Pepcid and Zyrtec pre medds              Musculoskeletal and Integument    Spasticity     Worsening sx  Continue keppra, OXC

## 2017-08-21 ENCOUNTER — INFUSION (OUTPATIENT)
Dept: ONCOLOGY | Facility: HOSPITAL | Age: 32
End: 2017-08-21

## 2017-08-21 VITALS
SYSTOLIC BLOOD PRESSURE: 166 MMHG | HEART RATE: 90 BPM | HEIGHT: 74 IN | BODY MASS INDEX: 19.12 KG/M2 | DIASTOLIC BLOOD PRESSURE: 70 MMHG | WEIGHT: 149 LBS | RESPIRATION RATE: 20 BRPM | TEMPERATURE: 98.1 F

## 2017-08-21 DIAGNOSIS — G35 MULTIPLE SCLEROSIS (HCC): Primary | ICD-10-CM

## 2017-08-21 PROCEDURE — 96415 CHEMO IV INFUSION ADDL HR: CPT

## 2017-08-21 PROCEDURE — 96365 THER/PROPH/DIAG IV INF INIT: CPT

## 2017-08-21 PROCEDURE — 25010000003 METHYLPREDNISOLONE PER 125 MG: Performed by: PSYCHIATRY & NEUROLOGY

## 2017-08-21 PROCEDURE — 25010000002 DIPHENHYDRAMINE PER 50 MG: Performed by: PSYCHIATRY & NEUROLOGY

## 2017-08-21 PROCEDURE — 96367 TX/PROPH/DG ADDL SEQ IV INF: CPT

## 2017-08-21 PROCEDURE — 25010000002 ALEMTUZUMAB 12 MG/1.2ML SOLUTION 1.2 ML VIAL: Performed by: PSYCHIATRY & NEUROLOGY

## 2017-08-21 PROCEDURE — 96375 TX/PRO/DX INJ NEW DRUG ADDON: CPT

## 2017-08-21 PROCEDURE — 96366 THER/PROPH/DIAG IV INF ADDON: CPT

## 2017-08-21 PROCEDURE — 96413 CHEMO IV INFUSION 1 HR: CPT

## 2017-08-21 RX ORDER — SODIUM CHLORIDE 9 MG/ML
250 INJECTION, SOLUTION INTRAVENOUS ONCE
Status: CANCELLED | OUTPATIENT
Start: 2017-08-21

## 2017-08-21 RX ORDER — FAMOTIDINE 10 MG/ML
20 INJECTION, SOLUTION INTRAVENOUS EVERY 6 HOURS PRN
Status: CANCELLED | OUTPATIENT
Start: 2017-08-21

## 2017-08-21 RX ORDER — DIPHENHYDRAMINE HYDROCHLORIDE 50 MG/ML
25 INJECTION INTRAMUSCULAR; INTRAVENOUS ONCE
Status: CANCELLED | OUTPATIENT
Start: 2017-08-21

## 2017-08-21 RX ORDER — ACETAMINOPHEN 325 MG/1
650 TABLET ORAL ONCE
Status: CANCELLED | OUTPATIENT
Start: 2017-08-21

## 2017-08-21 RX ORDER — ACETAMINOPHEN 325 MG/1
650 TABLET ORAL ONCE
Status: COMPLETED | OUTPATIENT
Start: 2017-08-21 | End: 2017-08-21

## 2017-08-21 RX ORDER — ACYCLOVIR 200 MG/1
200 CAPSULE ORAL 2 TIMES DAILY
Qty: 60 CAPSULE | Refills: 1 | Status: SHIPPED | OUTPATIENT
Start: 2017-08-21 | End: 2017-09-13

## 2017-08-21 RX ORDER — ONDANSETRON 2 MG/ML
8 INJECTION INTRAMUSCULAR; INTRAVENOUS EVERY 8 HOURS PRN
Status: CANCELLED | OUTPATIENT
Start: 2017-08-21

## 2017-08-21 RX ORDER — SODIUM CHLORIDE 9 MG/ML
250 INJECTION, SOLUTION INTRAVENOUS ONCE
Status: COMPLETED | OUTPATIENT
Start: 2017-08-21 | End: 2017-08-21

## 2017-08-21 RX ADMIN — ACETAMINOPHEN 650 MG: 325 TABLET, FILM COATED ORAL at 08:57

## 2017-08-21 RX ADMIN — DIPHENHYDRAMINE HYDROCHLORIDE: 50 INJECTION INTRAMUSCULAR; INTRAVENOUS at 09:03

## 2017-08-21 RX ADMIN — SODIUM CHLORIDE 1000 MG: 9 INJECTION, SOLUTION INTRAVENOUS at 09:25

## 2017-08-21 RX ADMIN — SODIUM CHLORIDE 250 ML: 9 INJECTION, SOLUTION INTRAVENOUS at 08:56

## 2017-08-21 RX ADMIN — RENAGEL 12 MG: 400 TABLET ORAL at 10:30

## 2017-08-22 ENCOUNTER — INFUSION (OUTPATIENT)
Dept: ONCOLOGY | Facility: HOSPITAL | Age: 32
End: 2017-08-22

## 2017-08-22 VITALS
HEART RATE: 83 BPM | WEIGHT: 149 LBS | RESPIRATION RATE: 20 BRPM | SYSTOLIC BLOOD PRESSURE: 120 MMHG | TEMPERATURE: 98.5 F | HEIGHT: 74 IN | BODY MASS INDEX: 19.12 KG/M2 | DIASTOLIC BLOOD PRESSURE: 55 MMHG

## 2017-08-22 DIAGNOSIS — G35 MULTIPLE SCLEROSIS (HCC): Primary | ICD-10-CM

## 2017-08-22 PROCEDURE — 25010000002 DIPHENHYDRAMINE PER 50 MG: Performed by: PSYCHIATRY & NEUROLOGY

## 2017-08-22 PROCEDURE — 96365 THER/PROPH/DIAG IV INF INIT: CPT

## 2017-08-22 PROCEDURE — 96413 CHEMO IV INFUSION 1 HR: CPT

## 2017-08-22 PROCEDURE — 25010000003 METHYLPREDNISOLONE PER 125 MG: Performed by: PSYCHIATRY & NEUROLOGY

## 2017-08-22 PROCEDURE — 96415 CHEMO IV INFUSION ADDL HR: CPT

## 2017-08-22 PROCEDURE — 96375 TX/PRO/DX INJ NEW DRUG ADDON: CPT

## 2017-08-22 PROCEDURE — 25010000002 ALEMTUZUMAB 12 MG/1.2ML SOLUTION 1.2 ML VIAL: Performed by: PSYCHIATRY & NEUROLOGY

## 2017-08-22 PROCEDURE — 96367 TX/PROPH/DG ADDL SEQ IV INF: CPT

## 2017-08-22 RX ORDER — ACETAMINOPHEN 325 MG/1
650 TABLET ORAL ONCE
Status: CANCELLED | OUTPATIENT
Start: 2017-08-22

## 2017-08-22 RX ORDER — ONDANSETRON 2 MG/ML
8 INJECTION INTRAMUSCULAR; INTRAVENOUS EVERY 8 HOURS PRN
Status: CANCELLED | OUTPATIENT
Start: 2017-08-22

## 2017-08-22 RX ORDER — DIPHENHYDRAMINE HYDROCHLORIDE 50 MG/ML
25 INJECTION INTRAMUSCULAR; INTRAVENOUS ONCE
Status: CANCELLED | OUTPATIENT
Start: 2017-08-22

## 2017-08-22 RX ORDER — FAMOTIDINE 10 MG/ML
20 INJECTION, SOLUTION INTRAVENOUS EVERY 6 HOURS PRN
Status: CANCELLED | OUTPATIENT
Start: 2017-08-22

## 2017-08-22 RX ORDER — SODIUM CHLORIDE 9 MG/ML
250 INJECTION, SOLUTION INTRAVENOUS ONCE
Status: CANCELLED | OUTPATIENT
Start: 2017-08-22

## 2017-08-22 RX ORDER — SODIUM CHLORIDE 9 MG/ML
250 INJECTION, SOLUTION INTRAVENOUS ONCE
Status: COMPLETED | OUTPATIENT
Start: 2017-08-22 | End: 2017-08-22

## 2017-08-22 RX ORDER — ACETAMINOPHEN 325 MG/1
650 TABLET ORAL ONCE
Status: COMPLETED | OUTPATIENT
Start: 2017-08-22 | End: 2017-08-22

## 2017-08-22 RX ADMIN — SODIUM CHLORIDE 250 ML: 9 INJECTION, SOLUTION INTRAVENOUS at 09:15

## 2017-08-22 RX ADMIN — DIPHENHYDRAMINE HYDROCHLORIDE 25 MG: 50 INJECTION INTRAMUSCULAR; INTRAVENOUS at 08:23

## 2017-08-22 RX ADMIN — SODIUM CHLORIDE 1000 MG: 9 INJECTION, SOLUTION INTRAVENOUS at 08:42

## 2017-08-22 RX ADMIN — RENAGEL 12 MG: 400 TABLET ORAL at 09:15

## 2017-08-22 RX ADMIN — ACETAMINOPHEN 650 MG: 325 TABLET, FILM COATED ORAL at 08:19

## 2017-08-23 ENCOUNTER — INFUSION (OUTPATIENT)
Dept: ONCOLOGY | Facility: HOSPITAL | Age: 32
End: 2017-08-23

## 2017-08-23 VITALS
TEMPERATURE: 98.9 F | RESPIRATION RATE: 18 BRPM | SYSTOLIC BLOOD PRESSURE: 135 MMHG | BODY MASS INDEX: 19.25 KG/M2 | HEIGHT: 74 IN | DIASTOLIC BLOOD PRESSURE: 65 MMHG | WEIGHT: 150 LBS | HEART RATE: 90 BPM

## 2017-08-23 DIAGNOSIS — G35 MULTIPLE SCLEROSIS (HCC): Primary | ICD-10-CM

## 2017-08-23 PROCEDURE — 96366 THER/PROPH/DIAG IV INF ADDON: CPT

## 2017-08-23 PROCEDURE — 96413 CHEMO IV INFUSION 1 HR: CPT

## 2017-08-23 PROCEDURE — 25010000002 DIPHENHYDRAMINE PER 50 MG: Performed by: PSYCHIATRY & NEUROLOGY

## 2017-08-23 PROCEDURE — 25010000003 METHYLPREDNISOLONE PER 125 MG: Performed by: PSYCHIATRY & NEUROLOGY

## 2017-08-23 PROCEDURE — 96367 TX/PROPH/DG ADDL SEQ IV INF: CPT

## 2017-08-23 PROCEDURE — 96375 TX/PRO/DX INJ NEW DRUG ADDON: CPT

## 2017-08-23 PROCEDURE — 96415 CHEMO IV INFUSION ADDL HR: CPT

## 2017-08-23 PROCEDURE — 25010000002 ALEMTUZUMAB 12 MG/1.2ML SOLUTION 1.2 ML VIAL: Performed by: PSYCHIATRY & NEUROLOGY

## 2017-08-23 RX ORDER — ONDANSETRON 2 MG/ML
8 INJECTION INTRAMUSCULAR; INTRAVENOUS EVERY 8 HOURS PRN
Status: CANCELLED | OUTPATIENT
Start: 2017-08-23

## 2017-08-23 RX ORDER — SODIUM CHLORIDE 9 MG/ML
250 INJECTION, SOLUTION INTRAVENOUS ONCE
Status: CANCELLED | OUTPATIENT
Start: 2017-08-23

## 2017-08-23 RX ORDER — DIPHENHYDRAMINE HYDROCHLORIDE 50 MG/ML
25 INJECTION INTRAMUSCULAR; INTRAVENOUS ONCE
Status: COMPLETED | OUTPATIENT
Start: 2017-08-23 | End: 2017-08-23

## 2017-08-23 RX ORDER — FAMOTIDINE 10 MG/ML
20 INJECTION, SOLUTION INTRAVENOUS EVERY 6 HOURS PRN
Status: CANCELLED | OUTPATIENT
Start: 2017-08-23

## 2017-08-23 RX ORDER — SODIUM CHLORIDE 9 MG/ML
250 INJECTION, SOLUTION INTRAVENOUS ONCE
Status: COMPLETED | OUTPATIENT
Start: 2017-08-23 | End: 2017-08-23

## 2017-08-23 RX ORDER — ACETAMINOPHEN 325 MG/1
650 TABLET ORAL ONCE
Status: COMPLETED | OUTPATIENT
Start: 2017-08-23 | End: 2017-08-23

## 2017-08-23 RX ORDER — DIPHENHYDRAMINE HYDROCHLORIDE 50 MG/ML
25 INJECTION INTRAMUSCULAR; INTRAVENOUS ONCE
Status: CANCELLED | OUTPATIENT
Start: 2017-08-23

## 2017-08-23 RX ORDER — ACETAMINOPHEN 325 MG/1
650 TABLET ORAL ONCE
Status: CANCELLED | OUTPATIENT
Start: 2017-08-23

## 2017-08-23 RX ADMIN — SODIUM CHLORIDE 250 ML: 9 INJECTION, SOLUTION INTRAVENOUS at 08:17

## 2017-08-23 RX ADMIN — ACETAMINOPHEN 650 MG: 325 TABLET, FILM COATED ORAL at 08:17

## 2017-08-23 RX ADMIN — RENAGEL 12 MG: 400 TABLET ORAL at 09:18

## 2017-08-23 RX ADMIN — SODIUM CHLORIDE 1000 MG: 9 INJECTION, SOLUTION INTRAVENOUS at 08:17

## 2017-08-23 RX ADMIN — DIPHENHYDRAMINE HYDROCHLORIDE 25 MG: 50 INJECTION INTRAMUSCULAR; INTRAVENOUS at 08:48

## 2017-08-24 ENCOUNTER — INFUSION (OUTPATIENT)
Dept: ONCOLOGY | Facility: HOSPITAL | Age: 32
End: 2017-08-24

## 2017-08-24 VITALS
BODY MASS INDEX: 19.64 KG/M2 | SYSTOLIC BLOOD PRESSURE: 121 MMHG | HEART RATE: 75 BPM | TEMPERATURE: 98.6 F | DIASTOLIC BLOOD PRESSURE: 72 MMHG | HEIGHT: 74 IN | RESPIRATION RATE: 18 BRPM | WEIGHT: 153 LBS

## 2017-08-24 DIAGNOSIS — G35 MULTIPLE SCLEROSIS (HCC): Primary | ICD-10-CM

## 2017-08-24 PROCEDURE — 96375 TX/PRO/DX INJ NEW DRUG ADDON: CPT

## 2017-08-24 PROCEDURE — 96415 CHEMO IV INFUSION ADDL HR: CPT

## 2017-08-24 PROCEDURE — 25010000003 METHYLPREDNISOLONE PER 125 MG: Performed by: PSYCHIATRY & NEUROLOGY

## 2017-08-24 PROCEDURE — 25010000002 DIPHENHYDRAMINE PER 50 MG: Performed by: PSYCHIATRY & NEUROLOGY

## 2017-08-24 PROCEDURE — 25010000002 ALEMTUZUMAB 12 MG/1.2ML SOLUTION 1.2 ML VIAL: Performed by: PSYCHIATRY & NEUROLOGY

## 2017-08-24 PROCEDURE — 96365 THER/PROPH/DIAG IV INF INIT: CPT

## 2017-08-24 PROCEDURE — 96366 THER/PROPH/DIAG IV INF ADDON: CPT

## 2017-08-24 PROCEDURE — 96367 TX/PROPH/DG ADDL SEQ IV INF: CPT

## 2017-08-24 PROCEDURE — 96413 CHEMO IV INFUSION 1 HR: CPT

## 2017-08-24 RX ORDER — DIPHENHYDRAMINE HYDROCHLORIDE 50 MG/ML
25 INJECTION INTRAMUSCULAR; INTRAVENOUS ONCE
Status: COMPLETED | OUTPATIENT
Start: 2017-08-24 | End: 2017-08-24

## 2017-08-24 RX ORDER — ACETAMINOPHEN 325 MG/1
650 TABLET ORAL ONCE
Status: CANCELLED | OUTPATIENT
Start: 2017-08-24

## 2017-08-24 RX ORDER — SODIUM CHLORIDE 9 MG/ML
250 INJECTION, SOLUTION INTRAVENOUS ONCE
Status: COMPLETED | OUTPATIENT
Start: 2017-08-24 | End: 2017-08-24

## 2017-08-24 RX ORDER — DIPHENHYDRAMINE HYDROCHLORIDE 50 MG/ML
25 INJECTION INTRAMUSCULAR; INTRAVENOUS ONCE
Status: CANCELLED | OUTPATIENT
Start: 2017-08-24

## 2017-08-24 RX ORDER — FAMOTIDINE 10 MG/ML
20 INJECTION, SOLUTION INTRAVENOUS EVERY 6 HOURS PRN
Status: CANCELLED | OUTPATIENT
Start: 2017-08-24

## 2017-08-24 RX ORDER — ONDANSETRON 2 MG/ML
8 INJECTION INTRAMUSCULAR; INTRAVENOUS EVERY 8 HOURS PRN
Status: CANCELLED | OUTPATIENT
Start: 2017-08-24

## 2017-08-24 RX ORDER — SODIUM CHLORIDE 9 MG/ML
250 INJECTION, SOLUTION INTRAVENOUS ONCE
Status: CANCELLED | OUTPATIENT
Start: 2017-08-24

## 2017-08-24 RX ORDER — ACETAMINOPHEN 325 MG/1
650 TABLET ORAL ONCE
Status: COMPLETED | OUTPATIENT
Start: 2017-08-24 | End: 2017-08-24

## 2017-08-24 RX ADMIN — RENAGEL 12 MG: 400 TABLET ORAL at 09:18

## 2017-08-24 RX ADMIN — SODIUM CHLORIDE 1000 MG: 9 INJECTION, SOLUTION INTRAVENOUS at 08:26

## 2017-08-24 RX ADMIN — DIPHENHYDRAMINE HYDROCHLORIDE 25 MG: 50 INJECTION INTRAMUSCULAR; INTRAVENOUS at 08:26

## 2017-08-24 RX ADMIN — ACETAMINOPHEN 650 MG: 325 TABLET, FILM COATED ORAL at 08:26

## 2017-08-24 RX ADMIN — SODIUM CHLORIDE 250 ML: 9 INJECTION, SOLUTION INTRAVENOUS at 08:26

## 2017-08-25 ENCOUNTER — INFUSION (OUTPATIENT)
Dept: ONCOLOGY | Facility: HOSPITAL | Age: 32
End: 2017-08-25

## 2017-08-25 VITALS
HEART RATE: 86 BPM | TEMPERATURE: 98 F | HEIGHT: 74 IN | RESPIRATION RATE: 18 BRPM | BODY MASS INDEX: 19.92 KG/M2 | WEIGHT: 155.2 LBS | SYSTOLIC BLOOD PRESSURE: 144 MMHG | DIASTOLIC BLOOD PRESSURE: 77 MMHG

## 2017-08-25 DIAGNOSIS — G35 MULTIPLE SCLEROSIS (HCC): Primary | ICD-10-CM

## 2017-08-25 PROCEDURE — 25010000002 DIPHENHYDRAMINE PER 50 MG: Performed by: PSYCHIATRY & NEUROLOGY

## 2017-08-25 PROCEDURE — 96367 TX/PROPH/DG ADDL SEQ IV INF: CPT

## 2017-08-25 PROCEDURE — 25010000003 METHYLPREDNISOLONE PER 125 MG: Performed by: PSYCHIATRY & NEUROLOGY

## 2017-08-25 PROCEDURE — 96375 TX/PRO/DX INJ NEW DRUG ADDON: CPT

## 2017-08-25 PROCEDURE — 96365 THER/PROPH/DIAG IV INF INIT: CPT

## 2017-08-25 PROCEDURE — 25010000002 ALEMTUZUMAB 12 MG/1.2ML SOLUTION 1.2 ML VIAL: Performed by: PSYCHIATRY & NEUROLOGY

## 2017-08-25 PROCEDURE — 96366 THER/PROPH/DIAG IV INF ADDON: CPT

## 2017-08-25 PROCEDURE — 96415 CHEMO IV INFUSION ADDL HR: CPT

## 2017-08-25 PROCEDURE — 96413 CHEMO IV INFUSION 1 HR: CPT

## 2017-08-25 RX ORDER — ACETAMINOPHEN 325 MG/1
650 TABLET ORAL ONCE
Status: COMPLETED | OUTPATIENT
Start: 2017-08-25 | End: 2017-08-25

## 2017-08-25 RX ORDER — DIPHENHYDRAMINE HYDROCHLORIDE 50 MG/ML
25 INJECTION INTRAMUSCULAR; INTRAVENOUS ONCE
Status: CANCELLED | OUTPATIENT
Start: 2017-08-25

## 2017-08-25 RX ORDER — ONDANSETRON 2 MG/ML
8 INJECTION INTRAMUSCULAR; INTRAVENOUS EVERY 8 HOURS PRN
OUTPATIENT
Start: 2017-08-25

## 2017-08-25 RX ORDER — SODIUM CHLORIDE 9 MG/ML
250 INJECTION, SOLUTION INTRAVENOUS ONCE
Status: COMPLETED | OUTPATIENT
Start: 2017-08-25 | End: 2017-08-25

## 2017-08-25 RX ORDER — FAMOTIDINE 10 MG/ML
20 INJECTION, SOLUTION INTRAVENOUS EVERY 6 HOURS PRN
OUTPATIENT
Start: 2017-08-25

## 2017-08-25 RX ORDER — DIPHENHYDRAMINE HYDROCHLORIDE 50 MG/ML
25 INJECTION INTRAMUSCULAR; INTRAVENOUS ONCE
Status: COMPLETED | OUTPATIENT
Start: 2017-08-25 | End: 2017-08-25

## 2017-08-25 RX ORDER — SODIUM CHLORIDE 9 MG/ML
250 INJECTION, SOLUTION INTRAVENOUS ONCE
Status: CANCELLED | OUTPATIENT
Start: 2017-08-25

## 2017-08-25 RX ORDER — ACETAMINOPHEN 325 MG/1
650 TABLET ORAL ONCE
Status: CANCELLED | OUTPATIENT
Start: 2017-08-25

## 2017-08-25 RX ADMIN — SODIUM CHLORIDE 250 ML: 9 INJECTION, SOLUTION INTRAVENOUS at 08:11

## 2017-08-25 RX ADMIN — RENAGEL 12 MG: 400 TABLET ORAL at 09:18

## 2017-08-25 RX ADMIN — DIPHENHYDRAMINE HYDROCHLORIDE 25 MG: 50 INJECTION INTRAMUSCULAR; INTRAVENOUS at 08:12

## 2017-08-25 RX ADMIN — ACETAMINOPHEN 650 MG: 325 TABLET, FILM COATED ORAL at 08:12

## 2017-08-25 RX ADMIN — SODIUM CHLORIDE 1000 MG: 9 INJECTION, SOLUTION INTRAVENOUS at 08:17

## 2017-09-13 ENCOUNTER — OFFICE VISIT (OUTPATIENT)
Dept: NEUROLOGY | Facility: CLINIC | Age: 32
End: 2017-09-13

## 2017-09-13 VITALS
HEIGHT: 74 IN | WEIGHT: 161 LBS | DIASTOLIC BLOOD PRESSURE: 76 MMHG | OXYGEN SATURATION: 97 % | SYSTOLIC BLOOD PRESSURE: 120 MMHG | HEART RATE: 84 BPM | BODY MASS INDEX: 20.66 KG/M2

## 2017-09-13 DIAGNOSIS — G35 MULTIPLE SCLEROSIS (HCC): Primary | ICD-10-CM

## 2017-09-13 DIAGNOSIS — R25.2 SPASTICITY: ICD-10-CM

## 2017-09-13 PROCEDURE — 99213 OFFICE O/P EST LOW 20 MIN: CPT | Performed by: PSYCHIATRY & NEUROLOGY

## 2017-09-13 NOTE — PROGRESS NOTES
Subjective:     Patient ID: Shane Hernandez is a 32 y.o. male.    History of Present Illness     32 y.o.  male with RRMS s/p 8/21/17 - 8/25/17 Lemtrada 1/2 and LE spasticity returns in follow up.  Last visit on 8/18/17 prescribed Lemtrada, stopped Keppra for spasticity.     7/31/17:  HIV, Hepatitis, CBC, CMP, VZV ab, TB - unremarkable  JCV ab index 2.19  5/19/17    RRMS     After infusion had joint pain for 3 days but has resolved.  All the pain in legs has resolved and has started walking for exercise.  Decreased  mg BID and continues on  mg BID.   Fatigue is minimal.       LE spasticity    Improving after Lemtrada.   .     Problem history:    2016 developed numbness in left foot for 2 weeks then resolved.  12/19/16 LE started feeling weak and N/T started in anterior thighs.  Worsened to point where standing for over 5 minutes causes severe pain and cramping in LE from waist down.  Significant constipation but no bladder difficulty.  Reports perineal numbness.  Steroids and GBP of minimal benefit.   Works as  and required to stand all day.      Reviewed records from [facility] admission 12/26/16 - 12/30/16:    Presented to ED on 12/26/16 for numbness in bilateral thighs with periodic spasms of pain for 30 - 45 seconds.  Sx started 1 wk prior to admission.  Dr Art consulted and prescribed GBP, ordered LP.  Treated with IV Solu medrol     MRI;      L/S - unremakable  C-spine - unremarkable  T-spine - T4; T7/8 lesions no enhancement  Brain - right MCP, pontine, CC, numerous PVWM lesions    Labs:  CSF:  IgG synthesis rate 23.7; 9 OCB, Prot 90   NMO - neg    The following portions of the patient's history were reviewed and updated as appropriate: allergies, current medications, past family history, past medical history, past social history, past surgical history and problem list.    Review of Systems   Constitutional: Positive for fatigue. Negative for activity change and unexpected weight change.  "  HENT: Negative for facial swelling, hearing loss, tinnitus, trouble swallowing and voice change.    Eyes: Negative for photophobia, pain and visual disturbance.   Respiratory: Negative for apnea and choking.    Gastrointestinal: Negative for constipation.   Endocrine: Positive for heat intolerance. Negative for cold intolerance.   Genitourinary: Negative for difficulty urinating, frequency and urgency.   Musculoskeletal: Positive for back pain and gait problem. Negative for neck stiffness.   Allergic/Immunologic: Negative for immunocompromised state.   Neurological: Positive for tremors, weakness and numbness. Negative for dizziness, seizures, syncope, facial asymmetry, speech difficulty and light-headedness.   Hematological: Negative for adenopathy.   Psychiatric/Behavioral: Positive for dysphoric mood. Negative for confusion, decreased concentration, hallucinations and sleep disturbance. The patient is nervous/anxious.         Objective:  Vitals:    09/13/17 0819   BP: 120/76   Pulse: 84   SpO2: 97%   Weight: 161 lb (73 kg)   Height: 74\" (188 cm)       Neurologic Exam     Mental Status   Registration: recalls 3 of 3 objects. Recall at 5 minutes: recalls 3 of 3 objects. Follows 3 step commands.   Attention: normal. Concentration: normal.   Level of consciousness: alert  Knowledge: good and consistent with education.   Able to name object. Able to read. Able to repeat. Able to write. Normal comprehension.     Cranial Nerves     CN II   Visual fields full to confrontation.   Visual acuity: normal  Right visual field deficit: none  Left visual field deficit: none     CN III, IV, VI   Right pupil: Shape: regular. Reactivity: brisk. Consensual response: intact.   Left pupil: Shape: regular. Reactivity: brisk. Consensual response: intact.   Nystagmus: none   Diplopia: none  Ophthalmoparesis: none  Upgaze: normal  Downgaze: normal  Conjugate gaze: present  Vestibulo-ocular reflex: present    CN V   Facial sensation " intact.   Right corneal reflex: normal  Left corneal reflex: normal    CN VII   Right facial weakness: none  Left facial weakness: none    CN VIII   Hearing: intact    CN IX, X   Palate: symmetric  Right gag reflex: normal  Left gag reflex: normal    CN XI   Right sternocleidomastoid strength: normal  Left sternocleidomastoid strength: normal    CN XII   Tongue: not atrophic  Fasciculations: absent  Tongue deviation: none    Motor Exam   Muscle bulk: normal  Overall muscle tone: normal  Right arm tone: normal  Left arm tone: normal  Right leg tone: spastic  Left leg tone: spastic    Sensory Exam   Light touch normal.   Right arm light touch: normal  Left arm light touch: normal  Right arm vibration: normal  Left arm vibration: normal  Right arm proprioception: normal  Left arm proprioception: normal  Right arm pinprick: normal  Left arm pinprick: normal  Lowest sensation to pinprick on right: T10  Lowest sensation to pinprick on left: T10  Lowest sensation to vibration on right: T10  Lowest sensation to vibration on left: T10    Gait, Coordination, and Reflexes     Gait  Gait: normal    Tremor   Resting tremor: absent  Intention tremor: absent  Action tremor: absent    Reflexes   Reflexes 2+ except as noted.   Right patellar: 3+  Left patellar: 3+  Right achilles: 3+  Left achilles: 3+  Right ankle clonus: present  Left ankle clonus: present      Physical Exam   Constitutional: He appears well-developed and well-nourished.   Neurological: Gait normal.   Reflex Scores:       Patellar reflexes are 3+ on the right side and 3+ on the left side.       Achilles reflexes are 3+ on the right side and 3+ on the left side.  Nursing note and vitals reviewed.    Orders Only on 07/31/2017   Component Date Value Ref Range Status   • WBC 07/31/2017 7.6  3.4 - 10.8 x10E3/uL Final   • RBC 07/31/2017 4.70  4.14 - 5.80 x10E6/uL Final   • Hemoglobin 07/31/2017 13.7  12.6 - 17.7 g/dL Final   • Hematocrit 07/31/2017 39.9  37.5 - 51.0 %  Final   • MCV 07/31/2017 85  79 - 97 fL Final   • MCH 07/31/2017 29.1  26.6 - 33.0 pg Final   • MCHC 07/31/2017 34.3  31.5 - 35.7 g/dL Final   • RDW 07/31/2017 14.3  12.3 - 15.4 % Final   • Platelets 07/31/2017 247  150 - 379 x10E3/uL Final   • Neutrophil Rel % 07/31/2017 49  % Final   • Lymphocyte Rel % 07/31/2017 29  % Final   • Monocyte Rel % 07/31/2017 12  % Final   • Eosinophil Rel % 07/31/2017 9  % Final   • Basophil Rel % 07/31/2017 1  % Final   • Neutrophils Absolute 07/31/2017 3.8  1.4 - 7.0 x10E3/uL Final   • Lymphocytes Absolute 07/31/2017 2.2  0.7 - 3.1 x10E3/uL Final   • Monocytes Absolute 07/31/2017 0.9  0.1 - 0.9 x10E3/uL Final   • Eosinophils Absolute 07/31/2017 0.7* 0.0 - 0.4 x10E3/uL Final   • Basophils Absolute 07/31/2017 0.1  0.0 - 0.2 x10E3/uL Final   • Immature Granulocyte Rel % 07/31/2017 0  % Final   • Immature Grans Absolute 07/31/2017 0.0  0.0 - 0.1 x10E3/uL Final   • Glucose 07/31/2017 88  65 - 99 mg/dL Final   • BUN 07/31/2017 10  6 - 20 mg/dL Final   • Creatinine 07/31/2017 0.95  0.76 - 1.27 mg/dL Final   • eGFR Non African Am 07/31/2017 104  >59 mL/min/1.73 Final   • eGFR African Am 07/31/2017 120  >59 mL/min/1.73 Final   • BUN/Creatinine Ratio 07/31/2017 11  9 - 20 Final   • Sodium 07/31/2017 144  134 - 144 mmol/L Final   • Potassium 07/31/2017 4.5  3.5 - 5.2 mmol/L Final   • Chloride 07/31/2017 105  96 - 106 mmol/L Final   • Total CO2 07/31/2017 25  18 - 29 mmol/L Final   • Calcium 07/31/2017 9.6  8.7 - 10.2 mg/dL Final   • Total Protein 07/31/2017 6.7  6.0 - 8.5 g/dL Final   • Albumin 07/31/2017 4.3  3.5 - 5.5 g/dL Final   • Globulin 07/31/2017 2.4  1.5 - 4.5 g/dL Final   • A/G Ratio 07/31/2017 1.8  1.2 - 2.2 Final   • Total Bilirubin 07/31/2017 <0.2  0.0 - 1.2 mg/dL Final   • Alkaline Phosphatase 07/31/2017 78  39 - 117 IU/L Final   • AST (SGOT) 07/31/2017 22  0 - 40 IU/L Final   • ALT (SGPT) 07/31/2017 17  0 - 44 IU/L Final   • Specific Gravity, UA 07/31/2017 1.010  1.005 - 1.030  Final   • pH, UA 07/31/2017 7.0  5.0 - 7.5 Final   • Color, UA 07/31/2017 Yellow  Yellow Final   • Appearance, UA 07/31/2017 Clear  Clear Final   • Leukocytes, UA 07/31/2017 Negative  Negative Final   • Protein 07/31/2017 Negative  Negative/Trace Final   • Glucose, UA 07/31/2017 Negative  Negative Final   • Ketones 07/31/2017 Negative  Negative Final   • Blood, UA 07/31/2017 Negative  Negative Final   • Bilirubin, UA 07/31/2017 Negative  Negative Final   • Urobilinogen, UA 07/31/2017 0.2  0.2 - 1.0 mg/dL Final   • Nitrite, UA 07/31/2017 Negative  Negative Final   • Microscopic Examination 07/31/2017 Comment   Final    Microscopic follows if indicated.   • MICROSCOPIC EXAMINATION 07/31/2017 See below:   Final    Microscopic was indicated and was performed.   • WBC, UA 07/31/2017 0-5  0 - 5 /hpf Final   • RBC, UA 07/31/2017 None seen  0 - 2 /hpf Final   • Epithelial Cells (non renal) 07/31/2017 None seen  0 - 10 /hpf Final   • Mucus, UA 07/31/2017 Present  Not Estab. Final   • Bacteria, UA 07/31/2017 None seen  None seen/Few Final   • QuantiFERON-TB Gold In Tube 07/31/2017 Indeterminate* Negative Final    Comment: Mitogen (positive control) gave low response.  This may indicate anergy or immune suppression. Early draws and  extended transit time may also result in low positive control and  indeterminate results.  The specimen received for QuantiFERON testing was incubated by the  ordering institution.  Specific procedures outlined in our Directory  of Services and in the package insert for the QuantiFERON Gold  (In Tube) test must be followed to enable for proper stimulation of  cells for the production of interferon gamma.     • QuantiFERON Criteria 07/31/2017 Comment   Final    Comment: To be considered positive a specimen should have a TB Ag minus Nil  value greater than or equal to 0.35 IU/mL and in addition the TB Ag  minus Nil value must be greater than or equal to 25% of the Nil  value. There may be  insufficient information in these values to  differentiate between some negative and some indeterminate test  values.     • QuantiFERON TB Ag Value 07/31/2017 0.03  IU/mL Final   • QuantiFERON Nil Value 07/31/2017 0.02  IU/mL Final   • QuantiFERON Mitogen Value 07/31/2017 0.10  IU/mL Final   • QFT TB Ag minus Nil Value 07/31/2017 0.01  IU/mL Final   • Interpretation 07/31/2017 Comment   Final    Comment: The QuantiFERON TB Gold (in Tube) assay is intended for use as an aid  in the diagnosis of TB infection. Negative results suggest that there  is no TB infection. In patients with high suspicion of exposure, a  negative test should be repeated. A positive test indicates infection  with Mycobacterium tuberculosis. Among individuals without  tuberculosis infection, a positive test may be due to exposure to  M. kansasii, M. szulgai or M. marinum. On the Internet, go to  cdc.gov/tb for further details.     • Hep A IgM 07/31/2017 Negative  Negative Final   • Hepatitis B Surface Ag 07/31/2017 Negative  Negative Final   • Hep B Core IgM 07/31/2017 Negative  Negative Final   • Hep C Virus Ab 07/31/2017 <0.1  0.0 - 0.9 s/co ratio Final    Comment:                                   Negative:     < 0.8                               Indeterminate: 0.8 - 0.9                                    Positive:     > 0.9   The CDC recommends that a positive HCV antibody result   be followed up with a HCV Nucleic Acid Amplification   test (946415).     • HIV Screen 4th Gen w/RFX (Referenc* 07/31/2017 Non Reactive  Non Reactive Final   • TSH 07/31/2017 0.678  0.450 - 4.500 uIU/mL Final   • Varicella IgG 07/31/2017 >4000  Immune >165 index Final    Comment:                                Negative          <135                                 Equivocal    135 - 165                                 Positive          >165  A positive result generally indicates exposure to the  pathogen or administration of specific immunoglobulins,  but it is  not indication of active infection or stage  of disease.         Assessment/Plan:       Problems Addressed this Visit        Nervous and Auditory    Multiple sclerosis - Primary     Significant improvement after Lemtrada    Continue monthly labs and Acyclovir                  Musculoskeletal and Integument    Spasticity     Wean off GBP then OXC if no pain returns in legs

## 2017-10-16 ENCOUNTER — OFFICE VISIT (OUTPATIENT)
Dept: NEUROLOGY | Facility: CLINIC | Age: 32
End: 2017-10-16

## 2017-10-16 VITALS
HEIGHT: 74 IN | SYSTOLIC BLOOD PRESSURE: 120 MMHG | WEIGHT: 176 LBS | OXYGEN SATURATION: 97 % | BODY MASS INDEX: 22.59 KG/M2 | HEART RATE: 88 BPM | DIASTOLIC BLOOD PRESSURE: 64 MMHG

## 2017-10-16 DIAGNOSIS — G35 MULTIPLE SCLEROSIS (HCC): Primary | ICD-10-CM

## 2017-10-16 DIAGNOSIS — R25.2 SPASTICITY: ICD-10-CM

## 2017-10-16 PROCEDURE — 99213 OFFICE O/P EST LOW 20 MIN: CPT | Performed by: PSYCHIATRY & NEUROLOGY

## 2017-10-16 NOTE — PROGRESS NOTES
Subjective:     Patient ID: Shane Hernandez is a 32 y.o. male.  Chief Complaint   Patient presents with   • Multiple Sclerosis       History of Present Illness     32 y.o.  male with RRMS s/p 8/21/17 - 8/25/17 Lemtrada 1/2 and LE spasticity returns in follow up.  Last visit on 9/13/17 continued monthly labs and Acyclovir.    10/4/17:   CD4 94, U/A negative, TSH 0.921. Plt 292    JCV ab index 2.19  5/19/17    RRMS     Going to gym and hiking without difficulty.  Wishes to return to work full time.    Does not mild to moderate fatigue.  Weaning of GBP and OXC.     LE spasticity    Minimal sx after Lemtrada.   .     Problem history:    2016 developed numbness in left foot for 2 weeks then resolved.  12/19/16 LE started feeling weak and N/T started in anterior thighs.  Worsened to point where standing for over 5 minutes causes severe pain and cramping in LE from waist down.  Significant constipation but no bladder difficulty.  Reports perineal numbness.  Steroids and GBP of minimal benefit.   Works as  and required to stand all day.      Reviewed records from [facility] admission 12/26/16 - 12/30/16:    Presented to ED on 12/26/16 for numbness in bilateral thighs with periodic spasms of pain for 30 - 45 seconds.  Sx started 1 wk prior to admission.  Dr Art consulted and prescribed GBP, ordered LP.  Treated with IV Solu medrol     MRI;      L/S - unremakable  C-spine - unremarkable  T-spine - T4; T7/8 lesions no enhancement  Brain - right MCP, pontine, CC, numerous PVWM lesions    Labs:  CSF:  IgG synthesis rate 23.7; 9 OCB, Prot 90   NMO - neg    The following portions of the patient's history were reviewed and updated as appropriate: allergies, current medications, past family history, past medical history, past social history, past surgical history and problem list.    Review of Systems   Constitutional: Positive for fatigue. Negative for activity change and unexpected weight change.   HENT: Negative for facial  "swelling, hearing loss, tinnitus, trouble swallowing and voice change.    Eyes: Negative for photophobia, pain and visual disturbance.   Respiratory: Negative for apnea and choking.    Gastrointestinal: Negative for constipation.   Endocrine: Positive for heat intolerance. Negative for cold intolerance.   Genitourinary: Negative for difficulty urinating, frequency and urgency.   Musculoskeletal: Positive for back pain and gait problem. Negative for neck stiffness.   Allergic/Immunologic: Negative for immunocompromised state.   Neurological: Positive for tremors, weakness and numbness. Negative for dizziness, seizures, syncope, facial asymmetry, speech difficulty and light-headedness.   Hematological: Negative for adenopathy.   Psychiatric/Behavioral: Positive for dysphoric mood. Negative for confusion, decreased concentration, hallucinations and sleep disturbance. The patient is nervous/anxious.         Objective:  Vitals:    10/16/17 1020   BP: 120/64   Pulse: 88   SpO2: 97%   Weight: 176 lb (79.8 kg)   Height: 74\" (188 cm)       Neurologic Exam     Mental Status   Registration: recalls 3 of 3 objects. Recall at 5 minutes: recalls 3 of 3 objects. Follows 3 step commands.   Attention: normal. Concentration: normal.   Level of consciousness: alert  Knowledge: good and consistent with education.   Able to name object. Able to read. Able to repeat. Able to write. Normal comprehension.     Cranial Nerves     CN II   Visual fields full to confrontation.   Visual acuity: normal  Right visual field deficit: none  Left visual field deficit: none     CN III, IV, VI   Right pupil: Shape: regular. Reactivity: brisk. Consensual response: intact.   Left pupil: Shape: regular. Reactivity: brisk. Consensual response: intact.   Nystagmus: none   Diplopia: none  Ophthalmoparesis: none  Upgaze: normal  Downgaze: normal  Conjugate gaze: present  Vestibulo-ocular reflex: present    CN V   Facial sensation intact.   Right corneal " reflex: normal  Left corneal reflex: normal    CN VII   Right facial weakness: none  Left facial weakness: none    CN VIII   Hearing: intact    CN IX, X   Palate: symmetric  Right gag reflex: normal  Left gag reflex: normal    CN XI   Right sternocleidomastoid strength: normal  Left sternocleidomastoid strength: normal    CN XII   Tongue: not atrophic  Fasciculations: absent  Tongue deviation: none    Motor Exam   Muscle bulk: normal  Overall muscle tone: normal  Right arm tone: normal  Left arm tone: normal  Right leg tone: spastic  Left leg tone: spastic    Sensory Exam   Light touch normal.   Right arm light touch: normal  Left arm light touch: normal  Right arm vibration: normal  Left arm vibration: normal  Right arm proprioception: normal  Left arm proprioception: normal  Right arm pinprick: normal  Left arm pinprick: normal  Lowest sensation to pinprick on right: T10  Lowest sensation to pinprick on left: T10  Lowest sensation to vibration on right: T10  Lowest sensation to vibration on left: T10    Gait, Coordination, and Reflexes     Gait  Gait: normal    Tremor   Resting tremor: absent  Intention tremor: absent  Action tremor: absent    Reflexes   Reflexes 2+ except as noted.   Right patellar: 3+  Left patellar: 3+  Right achilles: 3+  Left achilles: 3+  Right ankle clonus: present  Left ankle clonus: present      Physical Exam   Constitutional: He appears well-developed and well-nourished.   Neurological: Gait normal.   Reflex Scores:       Patellar reflexes are 3+ on the right side and 3+ on the left side.       Achilles reflexes are 3+ on the right side and 3+ on the left side.  Nursing note and vitals reviewed.    Orders Only on 07/31/2017   Component Date Value Ref Range Status   • WBC 07/31/2017 7.6  3.4 - 10.8 x10E3/uL Final   • RBC 07/31/2017 4.70  4.14 - 5.80 x10E6/uL Final   • Hemoglobin 07/31/2017 13.7  12.6 - 17.7 g/dL Final   • Hematocrit 07/31/2017 39.9  37.5 - 51.0 % Final   • MCV 07/31/2017  85  79 - 97 fL Final   • MCH 07/31/2017 29.1  26.6 - 33.0 pg Final   • MCHC 07/31/2017 34.3  31.5 - 35.7 g/dL Final   • RDW 07/31/2017 14.3  12.3 - 15.4 % Final   • Platelets 07/31/2017 247  150 - 379 x10E3/uL Final   • Neutrophil Rel % 07/31/2017 49  % Final   • Lymphocyte Rel % 07/31/2017 29  % Final   • Monocyte Rel % 07/31/2017 12  % Final   • Eosinophil Rel % 07/31/2017 9  % Final   • Basophil Rel % 07/31/2017 1  % Final   • Neutrophils Absolute 07/31/2017 3.8  1.4 - 7.0 x10E3/uL Final   • Lymphocytes Absolute 07/31/2017 2.2  0.7 - 3.1 x10E3/uL Final   • Monocytes Absolute 07/31/2017 0.9  0.1 - 0.9 x10E3/uL Final   • Eosinophils Absolute 07/31/2017 0.7* 0.0 - 0.4 x10E3/uL Final   • Basophils Absolute 07/31/2017 0.1  0.0 - 0.2 x10E3/uL Final   • Immature Granulocyte Rel % 07/31/2017 0  % Final   • Immature Grans Absolute 07/31/2017 0.0  0.0 - 0.1 x10E3/uL Final   • Glucose 07/31/2017 88  65 - 99 mg/dL Final   • BUN 07/31/2017 10  6 - 20 mg/dL Final   • Creatinine 07/31/2017 0.95  0.76 - 1.27 mg/dL Final   • eGFR Non African Am 07/31/2017 104  >59 mL/min/1.73 Final   • eGFR African Am 07/31/2017 120  >59 mL/min/1.73 Final   • BUN/Creatinine Ratio 07/31/2017 11  9 - 20 Final   • Sodium 07/31/2017 144  134 - 144 mmol/L Final   • Potassium 07/31/2017 4.5  3.5 - 5.2 mmol/L Final   • Chloride 07/31/2017 105  96 - 106 mmol/L Final   • Total CO2 07/31/2017 25  18 - 29 mmol/L Final   • Calcium 07/31/2017 9.6  8.7 - 10.2 mg/dL Final   • Total Protein 07/31/2017 6.7  6.0 - 8.5 g/dL Final   • Albumin 07/31/2017 4.3  3.5 - 5.5 g/dL Final   • Globulin 07/31/2017 2.4  1.5 - 4.5 g/dL Final   • A/G Ratio 07/31/2017 1.8  1.2 - 2.2 Final   • Total Bilirubin 07/31/2017 <0.2  0.0 - 1.2 mg/dL Final   • Alkaline Phosphatase 07/31/2017 78  39 - 117 IU/L Final   • AST (SGOT) 07/31/2017 22  0 - 40 IU/L Final   • ALT (SGPT) 07/31/2017 17  0 - 44 IU/L Final   • Specific Gravity, UA 07/31/2017 1.010  1.005 - 1.030 Final   • pH, UA  07/31/2017 7.0  5.0 - 7.5 Final   • Color, UA 07/31/2017 Yellow  Yellow Final   • Appearance, UA 07/31/2017 Clear  Clear Final   • Leukocytes, UA 07/31/2017 Negative  Negative Final   • Protein 07/31/2017 Negative  Negative/Trace Final   • Glucose, UA 07/31/2017 Negative  Negative Final   • Ketones 07/31/2017 Negative  Negative Final   • Blood, UA 07/31/2017 Negative  Negative Final   • Bilirubin, UA 07/31/2017 Negative  Negative Final   • Urobilinogen, UA 07/31/2017 0.2  0.2 - 1.0 mg/dL Final   • Nitrite, UA 07/31/2017 Negative  Negative Final   • Microscopic Examination 07/31/2017 Comment   Final    Microscopic follows if indicated.   • MICROSCOPIC EXAMINATION 07/31/2017 See below:   Final    Microscopic was indicated and was performed.   • WBC, UA 07/31/2017 0-5  0 - 5 /hpf Final   • RBC, UA 07/31/2017 None seen  0 - 2 /hpf Final   • Epithelial Cells (non renal) 07/31/2017 None seen  0 - 10 /hpf Final   • Mucus, UA 07/31/2017 Present  Not Estab. Final   • Bacteria, UA 07/31/2017 None seen  None seen/Few Final   • QuantiFERON-TB Gold In Tube 07/31/2017 Indeterminate* Negative Final    Comment: Mitogen (positive control) gave low response.  This may indicate anergy or immune suppression. Early draws and  extended transit time may also result in low positive control and  indeterminate results.  The specimen received for QuantiFERON testing was incubated by the  ordering institution.  Specific procedures outlined in our Directory  of Services and in the package insert for the QuantiFERON Gold  (In Tube) test must be followed to enable for proper stimulation of  cells for the production of interferon gamma.     • QuantiFERON Criteria 07/31/2017 Comment   Final    Comment: To be considered positive a specimen should have a TB Ag minus Nil  value greater than or equal to 0.35 IU/mL and in addition the TB Ag  minus Nil value must be greater than or equal to 25% of the Nil  value. There may be insufficient information in  these values to  differentiate between some negative and some indeterminate test  values.     • QuantiFERON TB Ag Value 07/31/2017 0.03  IU/mL Final   • QuantiFERON Nil Value 07/31/2017 0.02  IU/mL Final   • QuantiFERON Mitogen Value 07/31/2017 0.10  IU/mL Final   • QFT TB Ag minus Nil Value 07/31/2017 0.01  IU/mL Final   • Interpretation 07/31/2017 Comment   Final    Comment: The QuantiFERON TB Gold (in Tube) assay is intended for use as an aid  in the diagnosis of TB infection. Negative results suggest that there  is no TB infection. In patients with high suspicion of exposure, a  negative test should be repeated. A positive test indicates infection  with Mycobacterium tuberculosis. Among individuals without  tuberculosis infection, a positive test may be due to exposure to  M. kansasii, M. szulgai or M. marinum. On the Internet, go to  cdc.gov/tb for further details.     • Hep A IgM 07/31/2017 Negative  Negative Final   • Hepatitis B Surface Ag 07/31/2017 Negative  Negative Final   • Hep B Core IgM 07/31/2017 Negative  Negative Final   • Hep C Virus Ab 07/31/2017 <0.1  0.0 - 0.9 s/co ratio Final    Comment:                                   Negative:     < 0.8                               Indeterminate: 0.8 - 0.9                                    Positive:     > 0.9   The CDC recommends that a positive HCV antibody result   be followed up with a HCV Nucleic Acid Amplification   test (715688).     • HIV Screen 4th Gen w/RFX (Referenc* 07/31/2017 Non Reactive  Non Reactive Final   • TSH 07/31/2017 0.678  0.450 - 4.500 uIU/mL Final   • Varicella IgG 07/31/2017 >4000  Immune >165 index Final    Comment:                                Negative          <135                                 Equivocal    135 - 165                                 Positive          >165  A positive result generally indicates exposure to the  pathogen or administration of specific immunoglobulins,  but it is not indication of active  infection or stage  of disease.         Assessment/Plan:       Problems Addressed this Visit        Nervous and Auditory    Multiple sclerosis - Primary     Significant improvement after Lemtrada    Ok to return to work without restrictions.               Musculoskeletal and Integument    Spasticity     Will continue to decrease to GBP and OXC as tolerated

## 2023-07-24 NOTE — PROGRESS NOTES
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     Diagnosis: Faulk Chromosome positive mixed phenotype ALL  Treatment: HyperCVAD + Dasatinib   Conditioning: Bu/Flu/Cy +Cy   Day 0: 7/13/23  Stem Cell Dose: 3.5 x 10^6 CD34+cells/kg  Day of Engraftment:  Complications: Neutropenic fevers, diarrhea    Recipient Donor (15y/o M)   A+ A+   HSV 1 Negative HSV 1 Negative   HSV 2 Negative HSV 2 Negative   CMV Positive CMV Negative   Toxoplasmosis Negative Toxoplasmosis   Not tested     History of Present Illness  Singh is a 20 year old  male   with history of ulcerative colitis s/p colectomy in 2020 who was transferred from [facility] for newly diagnosed acute leukemia.  He initially presented on 1/7/2023 with nausea/vomiting, abdominal pain, worsening diarrhea.  He thought this was related to UC flare.  He had CT imaging that showed mildly enlarged mesenteric lymph nodes but otherwise unrevealing.  He had CBC that was notable for atypical lymphocytes versus blasts.  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  He feels overall well this morning.  He says bowel discomfort and diarrhea has essentially resolved.  He denies any fevers.  He denies any recent weight loss, drenching night sweats, fevers/chills.  He does not smoke.  He does not drink alcohol.  He lives with his mom, dad, and younger brother.  He has history of UC that was diagnosed at age 3-4.  Mother believes he was on mesalamine and steroids for some time.  He then had complications in 2020 that resulted in colectomy and ostomy placement followed by reversal.  He has chronic diarrhea, 3-4 bowel movements per day on average.  Singh received 4 cycles of HyperCVAD + Dasatinib prior to evaluation for SCT. Bone marrow biopsy prior to SCT with  Subjective:     Patient ID: Shane Hernandez is a 32 y.o. male.    History of Present Illness     32 y.o.  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  Last visit on 7/10/17 started on Tecfidera and stopped Tysabri.  continued Keppra for spasticity.       HIV, Hepatitis, CBC, CMP, VZV ab, TB  JCV ab index 2.19  5/19/17    RRMS     Last Tysabri infusion on June 16.  Started on Tecfidera and tolerating without side effects.  Two weeks after starting Tecfidera N/T in feet/calf/ pelvis increased, left hand has painful tingling.  Fatigue is markedly worsening.  Spasticity increasing in legs.       Six infusions of Tysabri.      LE spasticity    Stiffness and cramping in legs worsening.  Keppra of mild benefit. .     Problem history:    2016 developed numbness in left foot for 2 weeks then resolved.  12/19/16 LE started feeling weak and N/T started in anterior thighs.  Worsened to point where standing for over 5 minutes causes severe pain and cramping in LE from waist down.  Significant constipation but no bladder difficulty.  Reports perineal numbness.  Steroids and GBP of minimal benefit.   Works as  and required to stand all day.      Reviewed records from [facility] admission 12/26/16 - 12/30/16:    Presented to ED on 12/26/16 for numbness in bilateral thighs with periodic spasms of pain for 30 - 45 seconds.  Sx started 1 wk prior to admission.  Dr Art consulted and prescribed GBP, ordered LP.  Treated with IV Solu medrol     MRI;      L/S - unremakable  C-spine - unremarkable  T-spine - T4; T7/8 lesions no enhancement  Brain - right MCP, pontine, CC, numerous PVWM lesions    Labs:  CSF:  IgG synthesis rate 23.7; 9 OCB, Prot 90   NMO - neg    The following portions of the patient's history were reviewed and updated as appropriate: allergies, current medications, past family history, past medical history, past social history, past surgical history and problem list.    Review of Systems  "  Constitutional: Negative for activity change and unexpected weight change.   HENT: Negative for facial swelling, hearing loss, tinnitus, trouble swallowing and voice change.    Eyes: Negative for photophobia, pain and visual disturbance.   Respiratory: Negative for apnea and choking.    Gastrointestinal: Negative for constipation.   Endocrine: Positive for heat intolerance. Negative for cold intolerance.   Genitourinary: Negative for difficulty urinating, frequency and urgency.   Musculoskeletal: Positive for back pain and gait problem. Negative for neck stiffness.   Allergic/Immunologic: Negative for immunocompromised state.   Neurological: Positive for tremors. Negative for dizziness, seizures, syncope, facial asymmetry, speech difficulty and light-headedness.   Hematological: Negative for adenopathy.   Psychiatric/Behavioral: Negative for confusion, decreased concentration, hallucinations and sleep disturbance. The patient is not nervous/anxious.         Objective:  Vitals:    07/31/17 1307   BP: 124/78   Pulse: 82   SpO2: 99%   Weight: 146 lb (66.2 kg)   Height: 74\" (188 cm)       Neurologic Exam     Mental Status   Registration: recalls 3 of 3 objects. Recall at 5 minutes: recalls 3 of 3 objects. Follows 3 step commands.   Attention: normal. Concentration: normal.   Level of consciousness: alert  Knowledge: good and consistent with education.   Able to name object. Able to read. Able to repeat. Able to write. Normal comprehension.     Cranial Nerves     CN II   Visual fields full to confrontation.   Visual acuity: normal  Right visual field deficit: none  Left visual field deficit: none     CN III, IV, VI   Right pupil: Shape: regular. Reactivity: brisk. Consensual response: intact.   Left pupil: Shape: regular. Reactivity: brisk. Consensual response: intact.   Nystagmus: none   Diplopia: none  Ophthalmoparesis: none  Upgaze: normal  Downgaze: normal  Conjugate gaze: present  Vestibulo-ocular reflex: " no evidence of leukemia, BCR/ABL negative.      07/05/23: Haploidentical SCT D-8: Singh was directly admitted to 8T this morning to begin conditioning prior to haploidentical SCT from his brother. Trifusion catheter placed this morning in IR. Singh reports feeling well at home, no recent viral illnesses. Denies nausea, vomiting, diarrhea, fevers, chills, shortness of breath, or chest pain. Started Carvedilol per Dr. Seay, will continue on admission. Plan to proceed with chemotherapy tomorrow morning per protocol.      07/06/23: Haploidentical SCT D-7: Singh was directly admitted to 8T this yesterday to begin conditioning prior to haploidentical SCT from his brother. Feeling tired this morning due to sleep interruptions light night. Denies nausea, vomiting, diarrhea, fevers, chills, shortness of breath, or chest pain. No bleeding.     07/07/23: Haploidentical SCT D-6: No acute events overnight. Singh is tolerating chemotherapy well without adverse events. Denies nausea, vomiting, fevers, chills, shortness of breath, or chest pain. Diarrhea per baseline 2/2 ulcerative colitis, improved with steroid premedications for chemotherapy. Denies blood in stool. Plan to proceed with chemotherapy per protocol.     July 8, 2023: Haploidentical allogeneic BMT, day -5.  Overall doing well, tolerating chemotherapy well.  He continues to eat and ambulate.  He denies any episodes of nausea, vomiting, diarrhea, chills, cough, SOB, TUCKER or chest pain.    July 9, 2023: Haploidentical allogeneic BMT, day -4.  Continues to do well and denies any nausea vomiting or diarrhea.  Fairly uneventful 24 hours.  Last dose of  busulfan today.    07/10/23: Haploidentical SCT D-3: Singh is feeling well today. Endorses some trouble sleeping at night, plan to add nightly melatonin. Continues to have about 2 loose stools daily per his baseline. Denies nausea, vomiting, abdominal pain, fevers, chills, shortness of breath, or chest pain.      07/11/23: Haploidentical SCT D-2: No acute events overnight. Singh is completing chemotherapy today with last doses of Fludarabine and Cytoxan. Tolerating well, denies mouth sores, abdominal pain, nausea, vomiting, diarrhea, fevers, chills, shortness of breath, or chest pain. Encouraged PO intake and ambulating around unit.     07/12/23: Haploidentical SCT D-1: Singh reports feeling well this morning. Denies nausea, vomiting, diarrhea, fevers, chills, shortness of breath, or chest pain. Plan for rest day today and infusion of cells tomorrow.     07/13/23: Haploidentical SCT D0: No acute events overnight. Singh reports feeling well this morning. Denies nausea, vomiting, diarrhea, fevers, chills, shortness of breath, or chest pain. Plan to receive cells today.     07/14/23: Haploidentical SCT D+1: Singh tolerated SCT infusion yesterday with no adverse events. He reports feeling well this morning. Denies nausea, vomiting, diarrhea, fevers, chills, shortness of breath, or chest pain.     07/15/23: Haploidentical SCT D+2: Singh tolerated SCT infusion on 7/13 with no acute adverse events. This morning he developed fever which could be related to haploidentical stem cell transplantation.  He denied any symptomatology in addition to the fever.  He received on dose of acetaminophen and remains afebrile since then.  Blood cultures with no growth at this time. He reports feeling well this morning. Denies nausea, vomiting, chills, shortness of breath, or chest pain. Chronic diarrhea which remains unchanged at this time.    07/16/23: Haploidentical SCT D+3: Singh continues doing well today. Blood cultures remain as \"no growth\". Denies nausea, vomiting, chills, shortness of breath, or chest pain. Chronic diarrhea which remains unchanged at this time. Denies sore throat.     07/17/23: Haploidentical SCT D+4: Singh was febrile overnight with tmax 39.3 and accompanying tachycardia. This morning, he reports feeling well.  present    CN V   Facial sensation intact.   Right corneal reflex: normal  Left corneal reflex: normal    CN VII   Right facial weakness: none  Left facial weakness: none    CN VIII   Hearing: intact    CN IX, X   Palate: symmetric  Right gag reflex: normal  Left gag reflex: normal    CN XI   Right sternocleidomastoid strength: normal  Left sternocleidomastoid strength: normal    CN XII   Tongue: not atrophic  Fasciculations: absent  Tongue deviation: none    Motor Exam   Muscle bulk: normal  Overall muscle tone: normal  Right arm tone: normal  Left arm tone: normal  Right leg tone: spastic  Left leg tone: spastic    Sensory Exam   Light touch normal.   Right arm light touch: normal  Left arm light touch: normal  Right arm vibration: normal  Left arm vibration: normal  Right arm proprioception: normal  Left arm proprioception: normal  Right arm pinprick: normal  Left arm pinprick: normal  Lowest sensation to pinprick on right: T10  Lowest sensation to pinprick on left: T10  Lowest sensation to vibration on right: T10  Lowest sensation to vibration on left: T10    Gait, Coordination, and Reflexes     Gait  Gait: normal    Tremor   Resting tremor: absent  Intention tremor: absent  Action tremor: absent    Reflexes   Reflexes 2+ except as noted.   Right patellar: 3+  Left patellar: 3+  Right achilles: 3+  Left achilles: 3+  Right ankle clonus: present  Left ankle clonus: present      Physical Exam   Constitutional: He appears well-developed and well-nourished.   Neurological: Gait normal.   Reflex Scores:       Patellar reflexes are 3+ on the right side and 3+ on the left side.       Achilles reflexes are 3+ on the right side and 3+ on the left side.  Nursing note and vitals reviewed.    Office Visit on 05/05/2017   Component Date Value Ref Range Status   • Hepatitis B Surface Ag 05/05/2017 Non-Reactive  Non-Reactive Final   • Hep A IgM 05/05/2017 Non-Reactive  Non-Reactive Final   • Hep B C IgM 05/05/2017 Non-Reactive   Non-Reactive Final   • Hepatitis C Ab 05/05/2017 Non-Reactive  Non-Reactive Final   • QuantiFERON-TB Gold In Tube 05/05/2017 Negative  Negative Final    The specimen received for QuantiFERON testing was incubated by the  ordering institution.  Specific procedures outlined in our Directory  of Services and in the package insert for the QuantiFERON Gold  (In Tube) test must be followed to enable for proper stimulation of  cells for the production of interferon gamma.   • QuantiFERON Criteria 05/05/2017 Comment   Final    To be considered positive a specimen should have a TB Ag minus Nil  value greater than or equal to 0.35 IU/mL and in addition the TB Ag  minus Nil value must be greater than or equal to 25% of the Nil  value. There may be insufficient information in these values to  differentiate between some negative and some indeterminate test  values.   • QuantiFERON TB Ag Value 05/05/2017 0.09  IU/mL Final   • QuantiFERON Nil Value 05/05/2017 0.06  IU/mL Final   • QuantiFERON Mitogen Value 05/05/2017 >10.00  IU/mL Final   • QFT TB Ag minus Nil Value 05/05/2017 0.03  IU/mL Final   • Interpretation 05/05/2017 Comment   Final    The QuantiFERON TB Gold (in Tube) assay is intended for use as an aid  in the diagnosis of TB infection. Negative results suggest that there  is no TB infection. In patients with high suspicion of exposure, a  negative test should be repeated. A positive test indicates infection  with Mycobacterium tuberculosis. Among individuals without  tuberculosis infection, a positive test may be due to exposure to  M. kansasii, M. szulgai or M. marinum. On the Internet, go to  cdc.gov/tb for further details.   • Varicella IgG 05/05/2017 >4000  Immune >165 index Final                                   Negative          <135                                 Equivocal    135 - 165                                 Positive          >165  A positive result generally indicates exposure to the  pathogen or  Denies nausea, vomiting, chills, shortness of breath, or chest pain. No focal symptoms consistent with infection, blood cultures NGTD. Faint rash present today on his neck, upper chest, and back. Endorsing diarrhea, will broaden to Zosyn per ID. Fevers likely 2/2 CRS s/p haploidentical SCT, will proceed with post-SCT Cytoxan per protocol.      07/18/23: Haploidentical SCT D+5: Singh was afebrile overnight, tmax 38.0C. He denies nausea, vomiting, diarrhea, chills, shortness of breath, or chest pain. Persistent red rash on neck, upper chest, and back. Continues on Zosyn per ID, diarrhea unchanged. Plan to start GVHD prophylaxis today with Tacrolimus and Cellcept.     07/19/23: Haploidentical SCT D+6: Singh was afebrile overnight with no chills or other symptoms of infection. He is endorsing worsening diarrhea today with 5-6 liquid bowel movements in past 24 hours, will test for C-diff. Rash remains unchanged on neck, upper chest, and back. Continues on Zosyn per ID.    07/20/23: Haploidentical SCT D+7: Singh is feeling well today. He feels the diarrhea has improved and is now soft stools 4-5x daily. C diff testing negative. Added Imodium PRN. Denies abdominal pain or cramping. Denies nausea, vomiting, fevers, chills, shortness of breath, or chest pain.     07/21/23: Haploidentical SCT D+8: No acute events overnight. Singh reports feeling well this morning. Denies mouth sores, abdominal pain, nausea, vomiting, fevers, chills, shortness of breath, or chest pain. Continues with multiple episodes of loose stool per shift. Rash has improved to back and neck.     07/22/23: Haploidentical SCT D+9: Singh continues to do well. Denies nausea, vomiting, abdominal pain, fevers, chills, shortness of breath, or chest pain. Denies needing any imodium. Remains neutropenic, awaiting count recovery.     07/23/23: Haploidentical SCT D+10: Singh today has some erythema around line site, denies pain. Ordering blood cultures, and  administration of specific immunoglobulins,  but it is not indication of active infection or stage  of disease.   • Glucose 05/05/2017 87  70 - 100 mg/dL Final   • BUN 05/05/2017 10  9 - 23 mg/dL Final   • Creatinine 05/05/2017 0.90  0.60 - 1.30 mg/dL Final   • Sodium 05/05/2017 142  132 - 146 mmol/L Final   • Potassium 05/05/2017 3.8  3.5 - 5.5 mmol/L Final   • Chloride 05/05/2017 110* 99 - 109 mmol/L Final   • CO2 05/05/2017 31.0  20.0 - 31.0 mmol/L Final   • Calcium 05/05/2017 10.0  8.7 - 10.4 mg/dL Final   • Total Protein 05/05/2017 7.0  5.7 - 8.2 g/dL Final   • Albumin 05/05/2017 4.40  3.20 - 4.80 g/dL Final   • ALT (SGPT) 05/05/2017 11  7 - 40 U/L Final   • AST (SGOT) 05/05/2017 20  0 - 33 U/L Final   • Alkaline Phosphatase 05/05/2017 78  25 - 100 U/L Final   • Total Bilirubin 05/05/2017 0.7  0.3 - 1.2 mg/dL Final   • eGFR   Amer 05/05/2017 117  >60 mL/min/1.73 Final   • Globulin 05/05/2017 2.6  gm/dL Final   • A/G Ratio 05/05/2017 1.7  1.5 - 2.5 g/dL Final   • BUN/Creatinine Ratio 05/05/2017 11.1  7.0 - 25.0 Final   • Anion Gap 05/05/2017 1.0* 3.0 - 11.0 mmol/L Final   • HIV-1 Abs-EIA 05/05/2017 Negative  Negative Final   • HIV-2 Ab 05/05/2017 Negative  Negative Final   • HIV 1/2 Final Interpretation 05/05/2017 Comment   Final    Negative for HIV-1 and HIV-2 antibodies  Follow-up testing for HIV-1 RNA, test [id number] is recommended  on a new specimen.   • WBC 05/05/2017 7.56  3.50 - 10.80 10*3/mm3 Final   • RBC 05/05/2017 4.67  4.20 - 5.76 10*6/mm3 Final   • Hemoglobin 05/05/2017 13.8  13.1 - 17.5 g/dL Final   • Hematocrit 05/05/2017 42.0  38.9 - 50.9 % Final   • MCV 05/05/2017 89.9  80.0 - 99.0 fL Final   • MCH 05/05/2017 29.6  27.0 - 31.0 pg Final   • MCHC 05/05/2017 32.9  32.0 - 36.0 g/dL Final   • RDW 05/05/2017 13.4  11.3 - 14.5 % Final   • RDW-SD 05/05/2017 43.9  37.0 - 54.0 fl Final   • MPV 05/05/2017 10.1  6.0 - 12.0 fL Final   • Platelets 05/05/2017 236  150 - 450 10*3/mm3 Final   • Neutrophil %  will start vanco. Remains neutropenic, and required platelet transfusion today. No signs of bleeding, denies blood in urine, stool, oral mucosa. Denies nausea, vomiting, fevers, chills, shortness of breath, or chest pain.     07/24/23: Haploidentical SCT D+11: Singh today is feeling well. Blood cultures ordered yesterday NGTD. Will continue vancomycin x3 days. He has low appetite over the past few days PO intake has been poor, weight is down. Plan to add Megace for appetite stimulant. Remains afebrile. Denies nausea, vomiting, diarrhea, chills, shortness of breath, or chest pain.           Review of Systems   Constitutional: Positive for activity change and appetite change. Negative for chills, fatigue and fever.   HENT: Negative for mouth sores, nosebleeds and sore throat.    Eyes: Negative.    Respiratory: Negative for cough and shortness of breath.    Cardiovascular: Negative for chest pain, palpitations and leg swelling.   Gastrointestinal: Positive for diarrhea. Negative for abdominal distention, abdominal pain, blood in stool, constipation, nausea and vomiting.   Endocrine: Negative.    Genitourinary: Negative for difficulty urinating.   Musculoskeletal: Negative for back pain, joint swelling and myalgias.   Skin: Positive for pallor and rash.   Allergic/Immunologic: Negative.    Neurological: Negative for tremors, syncope, weakness and headaches.   Psychiatric/Behavioral: Negative for confusion and sleep disturbance. The patient is not nervous/anxious.      ALLERGIES:  No Known Allergies    Current Facility-Administered Medications   Medication Dose Route Frequency Provider Last Rate Last Admin   • levoFLOXacin (LEVAQUIN) tablet 500 mg  500 mg Oral Janee Cormier MD       • megestrol (MEGACE) 40 mg/mL suspension 400 mg  400 mg Oral Daily Edita Ocampo, CNP       • sodium chloride 0.9% infusion   Intravenous Continuous PRN Rebekah Gresham APNP 25 mL/hr at 07/24/23 0630 Rate Verify at 07/24/23 0630    • VANCOMYCIN - PHARMACIST MONITORED Misc   Does not apply See Admin Instructions Edita Ocampo CNP       • vancomycin (VANCOCIN) 1,000 mg in sodium chloride 0.9 % 250 mL IVPB  1,000 mg Intravenous 2 times per day Edita Ocampo .7 mL/hr at 07/24/23 0951 1,000 mg at 07/24/23 0951   • sodium chloride 0.9% infusion   Intravenous Continuous PRN Sturmwind, Rebekah, APNP       • TACROlimus (PROGRAF) 2.2 mg in dextrose 5 % 240 mL total volume IVPB  2.2 mg Intravenous Q24H Montana Berry MD 10 mL/hr at 07/24/23 0630 Rate Verify at 07/24/23 0630   • loperamide (IMODIUM) capsule 2 mg  2 mg Oral PRN Edita Ocampo CNP       • sodium chloride (PF) 0.9 % injection 10 mL  10 mL Injection PRN Montana Berry MD       • melatonin tablet 6 mg  6 mg Oral Nightly Edita Ocampo CNP   6 mg at 07/23/23 2148   • sodium chloride 0.9 % flush bag 25 mL  25 mL Intravenous PRN Sturmwind, Rebekah, APNP       • sodium chloride (PF) 0.9 % injection 10 mL  10 mL Injection PRN Sturmwind, Rebekah, APNP   10 mL at 07/23/23 0850   • sodium chloride (PF) 0.9 % injection 10 mL  10 mL Injection 2 times per day Sturmwind, Rebekah, APNP   10 mL at 07/24/23 0739   • sodium chloride (PF) 0.9 % injection 20 mL  20 mL Injection PRN Sturmwind, Rebekah, APNP       • potassium CHLORIDE 40 mEq/100 mL IVPB premix  40 mEq Intravenous PRN Sturmwind, Rebekah, APNP   Completed at 07/21/23 0838   • magnesium sulfate 4 g in sterile water IVPB  4 g Intravenous PRN Sturmwind, Rebekah, APNP   Completed at 07/23/23 1027   • calcium gluconate 4 g in sodium chloride 0.9 % 250 mL IVPB   Intravenous PRN Sturmwind, Rebekah, APNP       • sodium PHOSPHATE 20 mmol in sodium chloride 0.9 % 250 mL IVPB  20 mmol Intravenous PRN Sturmwind, Rebekah, APNP       • sodium PHOSPHATE 40 mmol in sodium chloride 0.9 % 500 mL IVPB  40 mmol Intravenous PRN Rebekah Gresham, APNP       • potassium phosphate 20 mmol in sodium chloride 0.9 % 250 mL IVPB  20 mmol Intravenous PRN  05/05/2017 41.3  41.0 - 71.0 % Final   • Lymphocyte % 05/05/2017 40.6  24.0 - 44.0 % Final   • Monocyte % 05/05/2017 9.8  0.0 - 12.0 % Final   • Eosinophil % 05/05/2017 7.3* 0.0 - 3.0 % Final   • Basophil % 05/05/2017 0.7  0.0 - 1.0 % Final   • Immature Grans % 05/05/2017 0.3  0.0 - 0.6 % Final   • Neutrophils, Absolute 05/05/2017 3.13  1.50 - 8.30 10*3/mm3 Final   • Lymphocytes, Absolute 05/05/2017 3.07  0.60 - 4.80 10*3/mm3 Final   • Monocytes, Absolute 05/05/2017 0.74  0.00 - 1.00 10*3/mm3 Final   • Eosinophils, Absolute 05/05/2017 0.55* 0.10 - 0.30 10*3/mm3 Final   • Basophils, Absolute 05/05/2017 0.05  0.00 - 0.20 10*3/mm3 Final   • Immature Grans, Absolute 05/05/2017 0.02  0.00 - 0.03 10*3/mm3 Final       Assessment/Plan:       Problems Addressed this Visit        Nervous and Auditory    Multiple sclerosis - Primary     Worsening sx on Tecfidera    Switch to Lemtrada  Prednisone 20 mg taper            Relevant Orders    Hepatitis Panel, Acute    QuantiFERON TB Client Incubated    Varicella Zoster Antibody, IgG    CBC & Differential    Comprehensive Metabolic Panel    TSH    Urinalysis With Microscopic    HIV-1 / O / 2 Ag / Antibody 4th Generation       Musculoskeletal and Integument    Spasticity     Worsening off Tysabri          Relevant Orders    Hepatitis Panel, Acute    QuantiFERON TB Client Incubated    Varicella Zoster Antibody, IgG    CBC & Differential    Comprehensive Metabolic Panel    TSH    Urinalysis With Microscopic    HIV-1 / O / 2 Ag / Antibody 4th Generation                    Rebekah Gresham, APNP   Completed at 07/19/23 1007   • potassium phosphate 40 mmol in sodium chloride 0.9 % 500 mL IVPB  40 mmol Intravenous PRN Rebekah Gresham, APNP       • heparin (porcine) 100 UNIT/ML lock flush 80 Units  80 Units Intracatheter PRN Rebekah Gresham, APNP       • heparin (porcine) 100 UNIT/ML lock flush 80 Units  80 Units Intracatheter PRN Rebekah Gresham, APNP       • heparin (porcine) 100 UNIT/ML lock flush 90 Units  90 Units Intracatheter PRN Rebekah Gresham, APNP       • sodium chloride 0.9 % flush bag 250 mL  250 mL Intravenous Once PRN Montana Berry MD       • sodium chloride 0.9% infusion  1,000 mL Intravenous Continuous Montana Berry MD 83 mL/hr at 07/24/23 0630 Rate Verify at 07/24/23 0630   • furosemide (LASIX INJECT) injection 20 mg  20 mg Intravenous BID PRN Montana Berry MD   20 mg at 07/09/23 0622   • LORazepam (ATIVAN) tablet 1 mg  1 mg Oral Q6H PRN Montana Berry MD       • prochlorperazine (COMPAZINE) injection 10 mg  10 mg Intravenous Q6H PRN Montana Berry MD       • ursodiol (ACTIGALL) capsule 300 mg  300 mg Oral BID Montana Berry MD   300 mg at 07/23/23 2003   • filgrastim-sndz (G-CSF) (ZARXIO) injection 480 mcg  5 mcg/kg (Treatment Plan Recorded) Subcutaneous Daily at noon Montana Berry MD   480 mcg at 07/23/23 1134   • valACYclovir (VALTREX) tablet 500 mg  500 mg Oral BID Montana Berry MD   500 mg at 07/23/23 2003   • sodium chloride (NORMAL SALINE) 0.9 % bolus 1,000 mL  1,000 mL Intravenous Once PRN Montana Berry MD       • sodium chloride (NORMAL SALINE) 0.9 % bolus 1,000 mL  1,000 mL Intravenous Once PRN Montana Berry MD       • EPINEPHrine (ADRENALIN) injection 0.3 mg  0.3 mg Intramuscular Q5 Min PRN Montana Berry MD       • diphenhydrAMINE (BENADRYL) injection 50 mg  50 mg Intravenous Once PRN Montana Berry MD       • famotidine (PEPCID) injection 20 mg  20 mg Intravenous Once PRN Jamal  Montana MARROQUIN MD       • methylPREDNISolone (SOLU-Medrol) injection 125 mg  125 mg Intravenous Once PRN Montana Berry MD       • albuterol inhaler 2 puff  2 puff Inhalation Q20 Min PRN Montana Berry MD       • albuterol (VENTOLIN) nebulizer 5 mg  5 mg Nebulization Q20 Min PRN Montana Berry MD       • morphine injection 2 mg  2 mg Intravenous PRN Montana Berry MD       • carvedilol (COREG) tablet 3.125 mg  3.125 mg Oral BID WC Kadesabarachell Rebekah, APNP   3.125 mg at 07/23/23 1712   • voriconazole (VFEND) tablet 200 mg  200 mg Oral BID Montana Berry MD   200 mg at 07/23/23 2003   • mycophenolate (CELLCEPT) tablet 1,000 mg  15 mg/kg (Treatment Plan Recorded) Oral 3 times per day Montana Berry MD   1,000 mg at 07/24/23 0513       Vital Last Value 24 Hour Range   Temperature 98.6 °F (37 °C) (07/24/23 0945) Temp  Min: 98 °F (36.7 °C)  Max: 99 °F (37.2 °C)   Pulse 73 (07/24/23 0945) Pulse  Min: 73  Max: 116   Respiratory 20 (07/24/23 0945) Resp  Min: 16  Max: 20   Non-Invasive  Blood Pressure 133/76 (07/24/23 0945) BP  Min: 123/87  Max: 133/76   Pulse Oximetry 96 % (07/24/23 0515) SpO2  Min: 96 %  Max: 98 %   Arterial   Blood Pressure   No data recorded      Wt Readings from Last 1 Encounters:   07/24/23 59.1 kg (130 lb 4.7 oz)          Weight change: 0.2 kg (7.1 oz)       Intake/Output Summary (Last 24 hours) at 7/24/2023 1125  Last data filed at 7/24/2023 0945  Gross per 24 hour   Intake 4376.87 ml   Output 2450 ml   Net 1926.87 ml       Physical Exam  Constitutional:       General: He is not in acute distress.     Appearance: Normal appearance.   HENT:      Mouth/Throat:      Mouth: Mucous membranes are moist.      Pharynx: Oropharynx is clear.      Neck: Normal range of motion and neck supple.   Eyes:      Extraocular Movements: Extraocular movements intact.      Pupils: Pupils are equal, round, and reactive to light.   Cardiovascular:      Rate and Rhythm: Normal rate and regular rhythm.       Pulses: Normal pulses.      Heart sounds: Normal heart sounds.   Pulmonary:      Effort: Pulmonary effort is normal.      Breath sounds: Normal breath sounds.   Abdominal:      General: Abdomen is flat. There is no distension.      Palpations: Abdomen is soft.      Tenderness: There is no abdominal tenderness.   Musculoskeletal:         General: Normal range of motion.   Skin:     General: Skin is warm and dry.      Findings: Erythema (erythema at line insertion site) and rash (petechiae upper chest and back) present.   Neurological:      Mental Status: He is alert. Mental status is at baseline.   Psychiatric:         Mood and Affect: Mood normal.         Behavior: Behavior normal.     Laboratory Results  Recent Results (from the past 24 hour(s))   Blood Culture    Collection Time: 07/23/23 11:26 AM    Specimen: Blood, Central Line   Result Value Ref Range    Culture, Blood or Bone Marrow No Growth <24 hours    Magnesium    Collection Time: 07/24/23  5:13 AM   Result Value Ref Range    Magnesium 1.6 (L) 1.7 - 2.4 mg/dL   Phosphorus    Collection Time: 07/24/23  5:13 AM   Result Value Ref Range    Phosphorus 2.6 2.4 - 4.7 mg/dL   Comprehensive Metabolic Panel    Collection Time: 07/24/23  5:13 AM   Result Value Ref Range    Fasting Status      Sodium 139 135 - 145 mmol/L    Potassium 3.9 3.4 - 5.1 mmol/L    Chloride 111 (H) 97 - 110 mmol/L    Carbon Dioxide 25 21 - 32 mmol/L    Anion Gap 7 7 - 19 mmol/L    Glucose 111 (H) 70 - 99 mg/dL    BUN 5 (L) 6 - 20 mg/dL    Creatinine 0.52 (L) 0.67 - 1.17 mg/dL    Glomerular Filtration Rate >90 >=60    BUN/Cr 10 7 - 25    Calcium 8.6 8.4 - 10.2 mg/dL    Bilirubin, Total 0.7 0.2 - 1.0 mg/dL    GOT/AST 22 <=37 Units/L    GPT/ALT 31 <64 Units/L    Alkaline Phosphatase 129 (H) 45 - 117 Units/L    Albumin 2.8 (L) 3.6 - 5.1 g/dL    Protein, Total 6.3 (L) 6.4 - 8.2 g/dL    Globulin 3.5 2.0 - 4.0 g/dL    A/G Ratio 0.8 (L) 1.0 - 2.4   CBC with Automated Differential (performable  only)    Collection Time: 07/24/23  5:13 AM   Result Value Ref Range    WBC <0.1 (LL) 4.2 - 11.0 K/mcL    RBC 3.23 (L) 4.50 - 5.90 mil/mcL    HGB 8.8 (L) 13.0 - 17.0 g/dL    HCT 25.2 (L) 39.0 - 51.0 %    MCV 78.0 78.0 - 100.0 fl    MCH 27.2 26.0 - 34.0 pg    MCHC 34.9 32.0 - 36.5 g/dL    RDW-CV 12.2 11.0 - 15.0 %    RDW-SD 34.5 (L) 39.0 - 50.0 fL    PLT 7 (LL) 140 - 450 K/mcL    NRBC 0 <=0 /100 WBC   Prepare Platelets: 1 Units, Irradiated    Collection Time: 07/24/23  6:00 AM   Result Value Ref Range    UNIT BLOOD TYPE A Pos     ISBT BLOOD TYPE 6200     BLOOD EXPIRATION DATE [id number]     UNIT NUMBER [id number]     DISPENSE STATUS Issued     PRODUCT ID Platelets     PRODUCT CODE [id number]     PRODUCT DESCRIPTION SDP ACD-A>PAS-C LR     ISSUE DATE/TIME [id number]      Imaging  XR CHEST AP OR PA   Final Result      No acute cardiopulmonary findings.      Electronically Signed by: DOMITILA HALE M.D.    Signed on: 7/17/2023 3:52 AM    Workstation ID: [id number]      XR CHEST AP OR PA   Final Result       Normal heart and lungs.         Electronically Signed by: ARPITA LÓPEZ M.D.    Signed on: 7/15/2023 10:54 AM    Workstation ID: [id number]      IR TUNNELED CENTRAL LINE INSERTION AGE 3 OR OLDER   Final Result   Impression:      Status post left IJ tunneled Trifusion catheter placement.  Ready for use.      Pursestring suture removal in 2-3 days.      Electronically Signed by: AMBAR LYLE M.D.    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)    • Diarrhea    • Ulcerative colitis in pediatric patient (CMD)    Bu/Flu/Cy+Cy  Haploidentical Stem Cell Transplant  - Day -7     - Busulfan  - Day -6     - Busulfan     -  Fludarabine  - Day -5     - Busulfan      - Fludarabine  - Day -4     - Busulfan     - Fludarabine  - Day -3     - Fludarabine     - Cytoxan     - Mesna  - Day -2     - Fludarabine     - Cytoxan     - Mesna  - Day 0     - Stem Cell Infusion     - Infectious Disease Prophylaxis         - Micafungin 50 mg IVPB Daily        - Levofloxacin 500 mg PO Daily        - Valtrex 500 mg PO Q12H  - Day +3      - Cytoxan     - Mesna  - Day +4     - Cytoxan      - Mesna  - Day +5     - GVHD/Rejection Prophylaxis         - Tacrolimus         - Mycophenolate     - Growth Factors         - Neupogen     Hematology:  - Transfuse 1u pRBCs for hemoglobin < 7.0  - Transfuse 1u platelets for platelets < 10, or <20 if actively bleeding      Infectious Disease Prophylaxis:  - Levofloxacin 500 mg PO Daily on HOLD while receiving IV antibiotics   - Micafungin 50mg IVPB daily  - Valtrex 500 mg PO Q12H    Erythema at line insertion site:  - Blood cultures 7/23, NGTD  - Vancomycin pharmacy dosing start 7/23    Neutropenic fevers:  - ID consulted 7/17, appreciate recs  - Blood cultures 7/15 and 7/16 NGTD  - CXR negative for acute process 7/17  - S/p Cefepime q8h x 2 days  - Zosyn q8h start 7/17    Diarrhea:  - C-diff sent 7/19, negative  - Imodium PRN       Hx Cardiomyopathy:  - Continue Carvedilol 3.125mg BID      FEN/GI:  - Replete electrolytes per paper orders in patient's chart  - Fluids per chemotherapy protocol  - Megace 400mg PO QD       DVT Prophylaxis:  - Lovenox 40mg daily  - Discontinue when platelets < 50     KPS Score: 80%    Time  Total chart review and face to face time spent with patient 45 minutes.  Greater than 50% of the total time was spent counseling and/or coordinating care.     Case discussed with Dr. Montana Ocampo, ANDRES-BC